# Patient Record
Sex: FEMALE | Employment: UNEMPLOYED
[De-identification: names, ages, dates, MRNs, and addresses within clinical notes are randomized per-mention and may not be internally consistent; named-entity substitution may affect disease eponyms.]

---

## 2017-10-01 ENCOUNTER — HEALTH MAINTENANCE LETTER (OUTPATIENT)
Age: 37
End: 2017-10-01

## 2018-04-23 ENCOUNTER — PRENATAL OFFICE VISIT (OUTPATIENT)
Dept: NURSING | Facility: CLINIC | Age: 38
End: 2018-04-23
Payer: MEDICAID

## 2018-04-23 ENCOUNTER — TELEPHONE (OUTPATIENT)
Dept: OBGYN | Facility: CLINIC | Age: 38
End: 2018-04-23

## 2018-04-23 VITALS
TEMPERATURE: 98 F | SYSTOLIC BLOOD PRESSURE: 119 MMHG | OXYGEN SATURATION: 98 % | HEART RATE: 90 BPM | HEIGHT: 60 IN | WEIGHT: 157.8 LBS | BODY MASS INDEX: 30.98 KG/M2 | DIASTOLIC BLOOD PRESSURE: 77 MMHG

## 2018-04-23 DIAGNOSIS — N91.2 AMENORRHEA: Primary | ICD-10-CM

## 2018-04-23 DIAGNOSIS — Z34.80 PRENATAL CARE, SUBSEQUENT PREGNANCY, UNSPECIFIED TRIMESTER: ICD-10-CM

## 2018-04-23 LAB
ALBUMIN UR-MCNC: 30 MG/DL
APPEARANCE UR: ABNORMAL
BACTERIA #/AREA URNS HPF: ABNORMAL /HPF
BETA HCG QUAL IFA URINE: POSITIVE
BILIRUB UR QL STRIP: NEGATIVE
COLOR UR AUTO: YELLOW
ERYTHROCYTE [DISTWIDTH] IN BLOOD BY AUTOMATED COUNT: 14 % (ref 10–15)
GLUCOSE UR STRIP-MCNC: 100 MG/DL
HCT VFR BLD AUTO: 37 % (ref 35–47)
HGB BLD-MCNC: 12.8 G/DL (ref 11.7–15.7)
HGB UR QL STRIP: ABNORMAL
KETONES UR STRIP-MCNC: NEGATIVE MG/DL
LEUKOCYTE ESTERASE UR QL STRIP: ABNORMAL
MCH RBC QN AUTO: 30 PG (ref 26.5–33)
MCHC RBC AUTO-ENTMCNC: 34.6 G/DL (ref 31.5–36.5)
MCV RBC AUTO: 87 FL (ref 78–100)
NITRATE UR QL: NEGATIVE
NON-SQ EPI CELLS #/AREA URNS LPF: ABNORMAL /LPF
PH UR STRIP: >9 PH (ref 5–7)
PLATELET # BLD AUTO: 289 10E9/L (ref 150–450)
RBC # BLD AUTO: 4.26 10E12/L (ref 3.8–5.2)
RBC #/AREA URNS AUTO: ABNORMAL /HPF
SOURCE: ABNORMAL
SP GR UR STRIP: 1.01 (ref 1–1.03)
UROBILINOGEN UR STRIP-ACNC: 2 EU/DL (ref 0.2–1)
WBC # BLD AUTO: 8 10E9/L (ref 4–11)
WBC #/AREA URNS AUTO: ABNORMAL /HPF

## 2018-04-23 PROCEDURE — 87086 URINE CULTURE/COLONY COUNT: CPT | Performed by: INTERNAL MEDICINE

## 2018-04-23 PROCEDURE — 86780 TREPONEMA PALLIDUM: CPT | Performed by: OBSTETRICS & GYNECOLOGY

## 2018-04-23 PROCEDURE — 87389 HIV-1 AG W/HIV-1&-2 AB AG IA: CPT | Performed by: OBSTETRICS & GYNECOLOGY

## 2018-04-23 PROCEDURE — 81001 URINALYSIS AUTO W/SCOPE: CPT | Performed by: INTERNAL MEDICINE

## 2018-04-23 PROCEDURE — G0499 HEPB SCREEN HIGH RISK INDIV: HCPCS | Performed by: OBSTETRICS & GYNECOLOGY

## 2018-04-23 PROCEDURE — 99207 ZZC NO CHARGE NURSE ONLY: CPT

## 2018-04-23 PROCEDURE — 36415 COLL VENOUS BLD VENIPUNCTURE: CPT | Performed by: INTERNAL MEDICINE

## 2018-04-23 PROCEDURE — 86900 BLOOD TYPING SEROLOGIC ABO: CPT | Performed by: INTERNAL MEDICINE

## 2018-04-23 PROCEDURE — 84703 CHORIONIC GONADOTROPIN ASSAY: CPT | Performed by: FAMILY MEDICINE

## 2018-04-23 PROCEDURE — 86850 RBC ANTIBODY SCREEN: CPT | Performed by: INTERNAL MEDICINE

## 2018-04-23 PROCEDURE — 86901 BLOOD TYPING SEROLOGIC RH(D): CPT | Performed by: INTERNAL MEDICINE

## 2018-04-23 PROCEDURE — 86762 RUBELLA ANTIBODY: CPT | Performed by: OBSTETRICS & GYNECOLOGY

## 2018-04-23 PROCEDURE — 85027 COMPLETE CBC AUTOMATED: CPT | Performed by: INTERNAL MEDICINE

## 2018-04-23 RX ORDER — PRENATAL VIT/IRON FUM/FOLIC AC 27MG-0.8MG
1 TABLET ORAL DAILY
COMMUNITY

## 2018-04-23 NOTE — TELEPHONE ENCOUNTER
Patient was seen by RN at [place] for OB intake today.   See visit.   She reports she has been using over the counter nexium a couple times a week as needed for heartburn.     Per micromedex, fetal risk cannot be ruled out.    I advised her to try to avoid using it, will consult with OB provider regarding meds for heartburn.    Pharmacy selected.      Routed to Dr. Mccullough to advise on nexium use in pregnancy.      Venecia Gilbert RN  [facility]

## 2018-04-23 NOTE — Clinical Note
Dr. Mccullough Byron saw me for pregnancy confirmation and OB intake, accompanied by her  and child on apparently short notice, no  was scheduled.   is listed on release in chart for communication and he did a fine job interpreting and patient speaks fair English.   She has seen you in the past and hopes to deliver at [place].   Early dating US ordered due to report of irregular periods the last few months. She is having some left flank and lower left abdominal pain, also pain with urination.   Denies vaginal bleeding.  UA/UC pending.    Scheduled to see you in about 3 weeks, will do US ASAP.    Thanks. Kareen Gilbert, RN [facility]

## 2018-04-23 NOTE — NURSING NOTE
Patient presents to clinic today prenatal education, she did not call ahead to schedule but  is comfortable interpreting and we do have consent to communicate with  on file. This is the patient's 8th pregnancy. She has had 2 miscarriage(s), 0 (s), 3 term birth(s) and 2  birth(s). She has 4 living child(kelly).   This was a planned pregnancy.  Reviewed answers to patient's Prenatal OB Questionnaire. Screening is positive for age over 35 years at time of delivery, mild nausea, left lower abdominal and flank pain off and on, some pain with urination.     Medical, surgical, family and ObGyn history updated as appropriate. Reviewed current medications and allergies. Patient is taking prenatal vitamins.     Discussed all of the options within [place] for her prenatal care including Dr. Marion and Dr. Maria at [facility], midwives at [facility] and OB/GYN-Dr. Esparza and Dr. Barney. Next visit scheduled with Dr. Mccullough at [place] per patient request, she hopes to deliver at [place].     Reviewed and discussed OB 1st Trimester Plans/Education  and also summarized in detail handouts and brochures included in OB Prenatal Folder that patient is able to keep and bring home with her.    Discussed all of the blood tests with pt who agrees to have all including HIV. Pt aware that results will be discussed at next office visit with MD unless abnl results are indicated and phone call will be made.    Will forward chart on to Prenatal Care Provider to review.    Venecia Gilbert RN  [facility]

## 2018-04-23 NOTE — PROGRESS NOTES
Prenatal OB Questionnaire  Past Medical History  Diabetes   No  Hypertension   No  Heart Disease, mitral valve prolapse, or rheumatic fever?   No  An autoimmune disorder such as Lupus or Rheumatoid Arthritis?   No  Kidney Disease or Urinary Tract Infection?   No  Epilepsy, seizures or spells?   No  Migraine headaches?   No  A stroke or loss of function or sensation?   No  Any other neurological problems?   No  Have you ever been treated for depression?  No  Are you having problems with crying spells or loss of self-esteem?   No  Have you ever required psychiatric care?   No  Have you ever hepatitis, liver disease or jaundice?   No  Have you ever been treated for blood clots in your veins, deep venous thrombosis, inflammation in the veins, thrombosis, phlebitis, pulmonary embolism or varicosities?   No  Have you had excessive bleeding after surgery or dental work?   No  Do you bleed more than other women after a cut or scratch?   No  Do you have a history of anemia?   No  Have you ever been treated for thyroid problems or taken thyroid medication?  No  Do you have any other endocrine problems?  No  Have you ever been in a major accident or suffered serious trauma?   No  Within the last year, has anyone hit slapped, kicked or otherwise hurt you?  No  In the last year, has anyone forced you to have sex when you didn't want to?  No  Have you ever had a blood transfusion?   No  Would you refuse a blood transfusion if a doctor judged it to be medically necessary?   No  If you answered yes, would you rather die than have a blood transfusion?   N/A  If you answered yes, is this for Synagogue reasons?   N/A  Does anyone in your home smoke?   No  Do you use tobacco products?  No  Do you drink beer, wine, hard liquor?  No  Do you use any of the following: marijuana, speed, cocaine, heroine, hallucinogens, or other drugs?  No  Is your blood type Rh negative?   No  Have you ever had abnormal antibodies in your blood?   No  Have  you ever had asthma?   No  Have you ever had tuberculosis?   Yes, latent TB, was treated about 4-5 years ago  Do you have any allergies to drugs or over-the-counter medications?   Yes    Allergies as of 4/23/2018:    Allergies as of 04/23/2018 - [place] as Reviewed 04/23/2018   Allergen Reaction Noted     Lactated ringers Shortness Of Breath 05/22/2014     Latex  01/25/2013     Penicillins  06/04/2010       Do you have any breast problems?   No  Have you ever ?   Yes, about 1-2 months with her previous living children  Have you had any gynecological surgical procedures such as cervical conization, a LEEP procedure, laser treatment, cryosurgery of the cervix, or a dilation and curettage, etc?  No  Have you had any other surgical procedures?  No  Have you been hospitalized for a nonsurgical reason excluding normal delivery?   No  Have you ever had any anesthetic complications?   No  Have you ever had an abnormal pap smear?   No  Do you have a history of abnormalities of the uterus?   No  Did it take you more than one year to become pregnant?   No  Have you ever been evaluated or treated for infertility?   No  Is there a history of medical problems in your family, which you feel might adversely affect your health or pregnancy?   No  Do you have any other problems we have not asked you about which you feel may be important to this pregnancy?  No    Symptoms since Last Menstrual Period  Do you have any of the following:    *abdominal pain  Yes, reports low left intermittent abdominal pain, also pain to left flank for about 4-5 days, has pain with urination but denies chills or foul odor to urine.   Pain sometimes lasts an hour, denies vaginal bleeding  *blood in stool or urine  No  *chest pain  No  *shortness of breath  No  *coughing or vomiting up blood No  *heart racing or skipping beats  No  *nausea and vomiting  Yes, nausea for the past couple days, no vomiting, states is improving  *pain with urination  Yes,  for about 4-5 days, as above, denies chills or fever  *vaginal discharge or bleeding  No  Current medications are:  Current Outpatient Prescriptions   Medication Sig Dispense Refill     acetaminophen (TYLENOL) 325 MG tablet Take 325-650 mg by mouth every 6 hours as needed       Cholecalciferol (VITAMIN D) 2000 UNITS tablet 2 tabs daily for one month, then 1 tab daily 100 tablet 3     Prenatal Vit-Fe Fumarate-FA (PRENATAL MULTIVITAMIN PLUS IRON) 27-0.8 MG TABS per tablet Take 1 tablet by mouth daily       She reports she does use nexium a couple times a week, per [employer] online reference, fetal risk with this cannot be ruled out.   Phone encounter routed to Dr. Mccullough to advise on this.  Genetic Screening  At the time of birth, will you be 35 years old or older?  Yes  Has the patient, baby s father, or anyone in either family had:  Thalassemia (Italian, Greek, Mediterranean, or  background only) and an MCV result less than 80?  No  Neural tube defect such as meningomyelocele, spina bifida or anencephaly?  No  Congenital heart defect?  No  Down s syndrome?  No  Yahir-Sach s disease (Presybeterian, Cajun, French-[place])?  No  Sickle cell disease or trait (Evelia)?  No  Hemophilia or other inherited problems of blood coagulation? No  Muscular dystrophy?  No  Cystic Fibrosis?  No  [place] s chorea?  No  Mental retardation/autism? No   If yes, was the person tested for fragile X?  N/A  Any other inherited genetic or chromosomal disorder?  No  Maternal metabolic disorder (e.g. insulin-dependent diabetes, PKU)? No  A child with birth defects not listed above?  No  Recurrent pregnancy loss or a stillbirth?  No  Has the patient had any medications/street drugs/alcohol since her last menstrual period? No  Does the patient or baby s father have any other genetic risks?  No  Infection History  Do you object to being tested for Hepatitis B? No  Do you object to being tested for HIV? No  Do you feel that you are at high risk for  coming in contact with the AIDS virus?  No  Have you ever been treated for tuberculosis?  Yes, 4-5 years ago for latent TB  Have you ever received the BCG vaccine for tuberculosis?  No  Have you ever had a positive skin test for tuberculosis? Yes  Do you live with someone who has tuberculosis?  No  Have you ever been exposed to tuberculosis?  Yes, treated for latent TB 4-5 years ago  Do you have genital herpes?  No  Does your partner have genital herpes?  No  Have you had a rash or viral illness since your last period?  No  Have you ever had Gonorrhea, Chlamydia, Syphilis, venereal warts, trichomoniasis, pelvic inflammatory disease or any other sexually transmitted disease?  No  Do you know if you are a genital group B streptococcus carrier? No  You have not had chicken pox/varicella  Yes  Have you been vaccinated against chicken pox?  Yes  Have you had any other infectious disease? No        Early ultrasound screening tool:    Does patient have irregular periods?  Yes  Did patient use hormonal birth control in the three months prior to positive urine pregnancy test? No  Is the patient breastfeeding?  No  Is the patient 10 weeks or greater at time of education visit?  No    Early dating US ordered, number provided to  and patient to call to schedule.    Chart routed to OB provider Dr. Mccullough to review and await lab results.      Venecia Gilbert RN  [facility]

## 2018-04-23 NOTE — PATIENT INSTRUCTIONS
Call [facility] Scheduling at [phone] to schedule the early dating US BEFORE your appt with Dr. Mccullough, let them know that you would rather do this at the [place] location

## 2018-04-23 NOTE — TELEPHONE ENCOUNTER
I called and spoke to patient at [phone], she put  Phillip on the phone, we do have consent to communicate with him on file.    Advised him of Dr. Mccullough's advice regarding the nexium.    He verbalized understanding.    Venecia Gilbert RN  [facility]

## 2018-04-23 NOTE — TELEPHONE ENCOUNTER
Nexium is not of known high risk but suggest avoiding it now and returning to simple over the counter antacids such as Tums or Rolaids in the first trimester and avoidance of foods that aggravate the condition. Please notify.   Saran Mccullough MD

## 2018-04-23 NOTE — MR AVS SNAPSHOT
After Visit Summary   4/23/2018    Monserrat White    MRN: [id number]           Patient Information     Date Of Birth          1980        Visit Information        Provider Department      4/23/2018 2:00 PM PHONE, ; CP RN [facility]        Today's Diagnoses     Amenorrhea    -  1    Prenatal care, subsequent pregnancy, unspecified trimester          Care Instructions    Call [facility] Scheduling at [phone] to schedule the early dating US BEFORE your appt with Dr. Mccullough, let them know that you would rather do this at the [facility]          Follow-ups after your visit        Your next 10 appointments already scheduled     May 16, 2018  8:30 AM CDT   New Prenatal with Saran Mccullough MD   [facility] ([facility])    [address] [place] [address] [phone]              Future tests that were ordered for you today     Open Future Orders        Priority Expected Expires Ordered    US OB < 14 weeks, single,  for dating (EWV446) Routine  7/22/2018 4/23/2018            Who to contact     If you have questions or need follow up information about today's clinic visit or your schedule please contact [facility] directly at [phone].  Normal or non-critical lab and imaging results will be communicated to you by MyChart, letter or phone within 4 business days after the clinic has received the results. If you do not hear from us within 7 days, please contact the clinic through MyChart or phone. If you have a critical or abnormal lab result, we will notify you by phone as soon as possible.  Submit refill requests through Truist or call your pharmacy and they will forward the refill request to us. Please allow 3 business days for your refill to be completed.          Additional Information About Your Visit        [employer] Information     T[employer]ist lets you send  "messages to your doctor, view your test results, renew your prescriptions, schedule appointments and more. To sign up, go to [email or web address] . Click on \"Log in\" on the left side of the screen, which will take you to the Welcome page. Then click on \"Sign up Now\" on the right side of the page.     You will be asked to enter the access code listed below, as well as some personal information. Please follow the directions to create your username and password.     Your access code is: [email or web address]  Expires: 2018  2:47 PM     Your access code will  in 90 days. If you need help or a new code, please call your [place] clinic or [phone].        Care EveryWhere ID     This is your Care EveryWhere ID. This could be used by other organizations to access your [place] medical records  [id number]        Your Vitals Were     Pulse Temperature Height Last Period Pulse Oximetry Breastfeeding?    90 98  F (36.7  C) 5' (1.524 m) 2018 (Exact Date) 98% No    BMI (Body Mass Index)                   30.82 kg/m2            Blood Pressure from Last 3 Encounters:   18 119/77   01/22/15 113/75   14 112/73    Weight from Last 3 Encounters:   18 157 lb 12.8 oz (71.6 kg)   01/22/15 151 lb (68.5 kg)   14 149 lb 8 oz (67.8 kg)              We Performed the Following     ABO/RH Type and Screen     Anti Treponema     Beta HCG qual IFA urine     CBC with Platelets     Hepatitis B surface antigen     HIV Antigen Antibody Combo     Rubella Antibody IgG Quantitative     UA  Macroscopoic with Reflex to Micro     Urine Culture Aerobic Bacterial        Primary Care Provider Office Phone # Fax #    Brandan Miller [phone]       [address] [facility] [address]        Equal Access to Services     MARILEE WHITMORE : Gualberto Burton, cade reyna, yifan bateman. [facility] [phone].    ATENCIÓN: Si " barry hernandez, tiene a fernandez disposición servicios gratuitos de asistencia lingüística. Ekaterina pal [phone].    We comply with applicable federal civil rights laws and [place] laws. We do not discriminate on the basis of race, color, national origin, age, disability, sex, sexual orientation, or gender identity.            Thank you!     Thank you for choosing [facility]  for your care. Our goal is always to provide you with excellent care. Hearing back from our patients is one way we can continue to improve our services. Please take a few minutes to complete the written survey that you may receive in the mail after your visit with us. Thank you!             Your Updated Medication List - Protect others around you: Learn how to safely use, store and throw away your medicines at www.disposemymeds.org.          This list is accurate as of 4/23/18  2:47 PM.  Always use your most recent med list.                   Brand Name Dispense Instructions for use Diagnosis    prenatal multivitamin plus iron 27-0.8 MG Tabs per tablet      Take 1 tablet by mouth daily        TYLENOL 325 MG tablet   Generic drug:  acetaminophen      Take 325-650 mg by mouth every 6 hours as needed        vitamin D 2000 units tablet     100 tablet    2 tabs daily for one month, then 1 tab daily    Vitamin D deficiency

## 2018-04-24 LAB
ABO + RH BLD: NORMAL
ABO + RH BLD: NORMAL
BACTERIA SPEC CULT: NORMAL
BLD GP AB SCN SERPL QL: NORMAL
BLOOD BANK CMNT PATIENT-IMP: NORMAL
HBV SURFACE AG SERPL QL IA: NONREACTIVE
HIV 1+2 AB+HIV1 P24 AG SERPL QL IA: NONREACTIVE
RUBV IGG SERPL IA-ACNC: 87 IU/ML
SPECIMEN EXP DATE BLD: NORMAL
SPECIMEN SOURCE: NORMAL
T PALLIDUM IGG+IGM SER QL: NEGATIVE

## 2018-04-27 ENCOUNTER — RADIANT APPOINTMENT (OUTPATIENT)
Dept: ULTRASOUND IMAGING | Facility: CLINIC | Age: 38
End: 2018-04-27
Attending: OBSTETRICS & GYNECOLOGY
Payer: MEDICAID

## 2018-04-27 DIAGNOSIS — Z34.80 PRENATAL CARE, SUBSEQUENT PREGNANCY, UNSPECIFIED TRIMESTER: ICD-10-CM

## 2018-04-27 PROBLEM — O20.8 SUBCHORIONIC HEMORRHAGE IN FIRST TRIMESTER: Status: ACTIVE | Noted: 2018-04-27

## 2018-04-27 PROCEDURE — T1013 SIGN LANG/ORAL INTERPRETER: HCPCS | Mod: U3

## 2018-04-27 PROCEDURE — 76801 OB US < 14 WKS SINGLE FETUS: CPT

## 2018-05-16 ENCOUNTER — PRENATAL OFFICE VISIT (OUTPATIENT)
Dept: OBGYN | Facility: CLINIC | Age: 38
End: 2018-05-16
Payer: MEDICAID

## 2018-05-16 VITALS
WEIGHT: 153 LBS | BODY MASS INDEX: 29.88 KG/M2 | SYSTOLIC BLOOD PRESSURE: 116 MMHG | HEART RATE: 89 BPM | TEMPERATURE: 97.9 F | DIASTOLIC BLOOD PRESSURE: 77 MMHG

## 2018-05-16 DIAGNOSIS — Z34.80 PRENATAL CARE, SUBSEQUENT PREGNANCY, UNSPECIFIED TRIMESTER: ICD-10-CM

## 2018-05-16 PROCEDURE — 99207 ZZC FIRST OB VISIT: CPT | Performed by: OBSTETRICS & GYNECOLOGY

## 2018-05-16 PROCEDURE — T1013 SIGN LANG/ORAL INTERPRETER: HCPCS | Mod: U3 | Performed by: OBSTETRICS & GYNECOLOGY

## 2018-05-16 NOTE — MR AVS SNAPSHOT
After Visit Summary   5/16/2018    Monserrat White    MRN: [id number]           Patient Information     Date Of Birth          1980        Visit Information        Provider Department      5/16/2018 8:15 AM Saran Mccullough MD; [employer] [facility]        Today's Diagnoses     Subchorionic hemorrhage in first trimester        Prenatal care, subsequent pregnancy, unspecified trimester          Care Instructions                                                        If you have any questions regarding your visit, Please contact your care team.     [facility] Services: [phone]    [facility] CLINIC HOURS TELEPHONE NUMBER       Saran Mccullough M.D.      Beckie-      Milady-NITIN Bello-NITIN Mason-Medical Assistant       [facility]  8:00a.m-4:45 p.m  [facility]  9:00a.m-11:45 a.m  [facility] 8:00a.m-4:45 p.m.  [facility]  8:00a.m-4:45 p.m.  Friday[facility]  8:00a.m-4:45 p.m. [facility]  [address]  [place] [id number] [phone] ask [facility]  [phone] Fax  Imaging [phone]    [facility] Labor and Delivery  [address] [place] [address] [place] [address]  [phone]    [facility]  [address] [facility] [place] [address] [phone] [facility]  [phone] Fax  Imaging [phone]     Urgent Care locations:    [facility] Monday-Friday  5 pm - 9 pm  Saturday and Sunday   9 am - 5 pm    Monday-Friday   11 am - 9 pm  Saturday and Sunday   9 am - 5 pm   [phone]       If you need a medication refill, please contact your pharmacy. Please allow 3 business days for your refill to be completed.  As always, Thank you for trusting us with your healthcare needs!            Follow-ups after your visit        Who to contact     If you have questions  "or need follow up information about today's clinic visit or your schedule please contact [facility] directly at [phone].  Normal or non-critical lab and imaging results will be communicated to you by MyChart, letter or phone within 4 business days after the clinic has received the results. If you do not hear from us within 7 days, please contact the clinic through [employer]hart or phone. If you have a critical or abnormal lab result, we will notify you by phone as soon as possible.  Submit refill requests through Insync or call your pharmacy and they will forward the refill request to us. Please allow 3 business days for your refill to be completed.          Additional Information About Your Visit        [employer]Appetas Information     Insync lets you send messages to your doctor, view your test results, renew your prescriptions, schedule appointments and more. To sign up, go to [email or web address] . Click on \"Log in\" on the left side of the screen, which will take you to the Welcome page. Then click on \"Sign up Now\" on the right side of the page.     You will be asked to enter the access code listed below, as well as some personal information. Please follow the directions to create your username and password.     Your access code is: [email or web address]  Expires: 2018  2:47 PM     Your access code will  in 90 days. If you need help or a new code, please call your [place] clinic or [phone].        Care EveryWhere ID     This is your Care EveryWhere ID. This could be used by other organizations to access your [place] medical records  [id number]        Your Vitals Were     Pulse Temperature Last Period Breastfeeding? BMI (Body Mass Index)       89 97.9  F (36.6  C) (Oral) 2018 (Exact Date) No 29.88 kg/m2        Blood Pressure from Last 3 Encounters:   18 116/77   18 119/77   01/22/15 113/75    Weight from Last 3 Encounters:   18 153 lb (69.4 kg)   18 157 lb " 12.8 oz (71.6 kg)   01/22/15 151 lb (68.5 kg)              Today, you had the following     No orders found for display       Primary Care Provider Office Phone # Fax #    Brandan Miller [phone]       [address] [facility] [address]        Equal Access to Services     [facility]HMA : Hadii aad ku hadasho Soomaali, waaxda luqadaha, qaybta kaalmada adeegyada, waxay idiin hayaan adeeg kharash laGaloaan . So [facility] [phone].    ATENCIÓN: Si habla español, tiene a fernandez disposición servicios gratuitos de asistencia lingüística. Llame al [phone].    We comply with applicable federal civil rights laws and [place] laws. We do not discriminate on the basis of race, color, national origin, age, disability, sex, sexual orientation, or gender identity.            Thank you!     Thank you for choosing [facility]  for your care. Our goal is always to provide you with excellent care. Hearing back from our patients is one way we can continue to improve our services. Please take a few minutes to complete the written survey that you may receive in the mail after your visit with us. Thank you!             Your Updated Medication List - Protect others around you: Learn how to safely use, store and throw away your medicines at www.disposemymeds.org.          This list is accurate as of 5/16/18  8:40 AM.  Always use your most recent med list.                   Brand Name Dispense Instructions for use Diagnosis    prenatal multivitamin plus iron 27-0.8 MG Tabs per tablet      Take 1 tablet by mouth daily        TYLENOL 325 MG tablet   Generic drug:  acetaminophen      Take 325-650 mg by mouth every 6 hours as needed        vitamin D 2000 units tablet     100 tablet    2 tabs daily for one month, then 1 tab daily    Vitamin D deficiency

## 2018-05-16 NOTE — PATIENT INSTRUCTIONS
If you have any questions regarding your visit, Please contact your care team.     [facility]: [phone]    [facility] CLINIC HOURS TELEPHONE NUMBER       Saran Mccullough M.D.      Beckie-      Prakash Mason-Medical Assistant       Monday-[place]  8:00a.m-4:45 p.m  Tuesday-[place]  9:00a.m-11:45 a.m  Wednesday-[place] 8:00a.m-4:45 p.m.  Thursday-[place]  8:00a.m-4:45 p.m.  Friday-[place]  8:00a.m-4:45 p.m. [facility]  [address] [place] [address]  [phone] ask for [facility]  [phone] Fax  Imaging [phone]    [facility] Labor and Delivery  [address] [place] [address] [place] [address]  [phone]    [facility]  [address] [place] [address]  [phone] ask [facility]  [phone] Fax  Imaging [phone]     Urgent Care locations:    [facility] Monday-Friday  5 pm - 9 pm  Saturday and Sunday   9 am - 5 pm    Monday-Friday   11 am - 9 pm  Saturday and Sunday   9 am - 5 pm   [phone]       If you need a medication refill, please contact your pharmacy. Please allow 3 business days for your refill to be completed.  As always, Thank you for trusting us with your healthcare needs!

## 2018-05-18 NOTE — PROGRESS NOTES
Monserrat White is a 37 year old year old G 8 P 4 who presents for an initial obstetrical visit.  Referred by self.    Currently experiencing normal pregnancy symptoms without particular problems including pain, bleeding, marked vomiting or weight loss except: some N/V- improving, mild cramping and spotting with [place] [facility] evaluation.  This was a planned pregnancy and probable last one.  Here today with  and youngest son- doing well and .  Additional concerns: dates per early u/s, one late  birth but subsequent term deliveries without intervention, SAB x 2, mid-gestational IUFD due to apparent cord accident, history of gest DM, shoulder dystocia, and subsequent impaired glucose tolerance, AMA, proteinuria on recent UA, GERD increased, some epistaxis before and increased in pregnancy.     ROS:     Systems reviewed include constitutional; breast;                 cardiac; respiratory; gastrointestinal; genitourinary;                                musculoskeletal; integumentary; psychological;                                hematologic/lymphatic and endocrine.                  These systems were negative for significant symptoms except                 for the following: none and see above HPI.    Past medical, obstetrical, surgical, family and social history reviewed and as noted or updated in chart.     Allergies, meds and supplements are as noted or updated in chart.                    Episode OB dating, demographic and history forms completed or reviewed.    EXAM:  VS as noted. BMI- Body mass index is 29.88 kg/(m^2).    Relatively recent normal general exam- not repeated now.         ASSESSMENT: (O41.8X10,  O46.8X1) Subchorionic hemorrhage in first trimester  Comment:   Plan:     (Z34.80) Prenatal care, subsequent pregnancy, unspecified trimester  Comment:   Plan: Glucose tolerance gest std 100 gm 3 hr, Basic         metabolic panel               PLAN:  Advice appropriate to  gestational age reviewed including pertinent Checklist items. Discussed condition, tests and general care plan. Symptoms, problems and concerns reviewed. Recommended weight gain discussed. Problem list initiated. Pelvic rest and precautions given. Check 3h GTT and BMP soon. Pap due in 6 weeks. Orders as noted. RTC in 2-3 weeks. Discuss special screening tests next.    Saran Mccullough MD

## 2018-05-30 DIAGNOSIS — Z34.80 PRENATAL CARE, SUBSEQUENT PREGNANCY, UNSPECIFIED TRIMESTER: ICD-10-CM

## 2018-05-30 LAB
ANION GAP SERPL CALCULATED.3IONS-SCNC: 13 MMOL/L (ref 3–14)
BUN SERPL-MCNC: 7 MG/DL (ref 7–30)
CALCIUM SERPL-MCNC: 9.2 MG/DL (ref 8.5–10.1)
CHLORIDE SERPL-SCNC: 105 MMOL/L (ref 94–109)
CO2 SERPL-SCNC: 19 MMOL/L (ref 20–32)
CREAT SERPL-MCNC: 0.42 MG/DL (ref 0.52–1.04)
GFR SERPL CREATININE-BSD FRML MDRD: >90 ML/MIN/1.7M2
GLUCOSE 1H P 100 G GLC PO SERPL-MCNC: 227 MG/DL (ref 60–179)
GLUCOSE 2H P 100 G GLC PO SERPL-MCNC: 258 MG/DL (ref 60–154)
GLUCOSE 3H P 100 G GLC PO SERPL-MCNC: 199 MG/DL (ref 60–139)
GLUCOSE P FAST SERPL-MCNC: 115 MG/DL (ref 60–94)
GLUCOSE SERPL-MCNC: 119 MG/DL (ref 70–99)
POTASSIUM SERPL-SCNC: 3.7 MMOL/L (ref 3.4–5.3)
SODIUM SERPL-SCNC: 137 MMOL/L (ref 133–144)

## 2018-05-30 PROCEDURE — T1013 SIGN LANG/ORAL INTERPRETER: HCPCS | Mod: U3

## 2018-05-30 PROCEDURE — 80048 BASIC METABOLIC PNL TOTAL CA: CPT | Performed by: OBSTETRICS & GYNECOLOGY

## 2018-05-30 PROCEDURE — 82952 GTT-ADDED SAMPLES: CPT | Performed by: OBSTETRICS & GYNECOLOGY

## 2018-05-30 PROCEDURE — 36415 COLL VENOUS BLD VENIPUNCTURE: CPT | Performed by: OBSTETRICS & GYNECOLOGY

## 2018-05-30 PROCEDURE — 82951 GLUCOSE TOLERANCE TEST (GTT): CPT | Performed by: OBSTETRICS & GYNECOLOGY

## 2018-05-31 DIAGNOSIS — O24.410 DIET CONTROLLED GESTATIONAL DIABETES MELLITUS (GDM) IN FIRST TRIMESTER: Primary | ICD-10-CM

## 2018-06-03 PROBLEM — O24.419 GESTATIONAL DIABETES MELLITUS (GDM), ANTEPARTUM: Status: ACTIVE | Noted: 2018-06-03

## 2018-06-22 ENCOUNTER — OFFICE VISIT (OUTPATIENT)
Dept: FAMILY MEDICINE | Facility: CLINIC | Age: 38
End: 2018-06-22
Payer: MEDICAID

## 2018-06-22 VITALS
HEART RATE: 99 BPM | TEMPERATURE: 97.8 F | HEIGHT: 60 IN | OXYGEN SATURATION: 94 % | BODY MASS INDEX: 30.27 KG/M2 | DIASTOLIC BLOOD PRESSURE: 71 MMHG | SYSTOLIC BLOOD PRESSURE: 111 MMHG | WEIGHT: 154.2 LBS | RESPIRATION RATE: 16 BRPM

## 2018-06-22 DIAGNOSIS — Z34.92 PRENATAL CARE IN SECOND TRIMESTER: ICD-10-CM

## 2018-06-22 DIAGNOSIS — G44.219 EPISODIC TENSION-TYPE HEADACHE, NOT INTRACTABLE: Primary | ICD-10-CM

## 2018-06-22 PROCEDURE — 99214 OFFICE O/P EST MOD 30 MIN: CPT | Performed by: PHYSICIAN ASSISTANT

## 2018-06-22 RX ORDER — CYCLOBENZAPRINE HCL 10 MG
5 TABLET ORAL 2 TIMES DAILY PRN
Qty: 30 TABLET | Refills: 0 | Status: SHIPPED | OUTPATIENT
Start: 2018-06-22 | End: 2018-08-13

## 2018-06-22 ASSESSMENT — PAIN SCALES - GENERAL: PAINLEVEL: SEVERE PAIN (6)

## 2018-06-22 NOTE — MR AVS SNAPSHOT
After Visit Summary   6/22/2018    Monserrat White    MRN: [id number]           Patient Information     Date Of Birth          1980        Visit Information        Provider Department      6/22/2018 11:00 AM Izabel Ross PA-C [facility]        Today's Diagnoses     Episodic tension-type headache, not intractable    -  1    Prenatal care in second trimester          Care Instructions    Flexeril 5 mg twice a day as needed and Tylenol 500 mg every 6 hours as needed for pain   See OBGYN as soon as possible     Wear compression stockings daily to prevent leg swelling  Make appointment with diabetes educator          Follow-ups after your visit        Additional Services     OB/GYN REFERRAL       Your provider has referred you to:  FM[facility]: [facility] [phone]   [email or web address]     Please be aware that coverage of these services is subject to the terms and limitations of your health insurance plan.  Call member services at your health plan with any benefit or coverage questions.      Please bring the following with you to your appointment:    (1) Any X-Rays, CTs or MRIs which have been performed.  Contact the facility where they were done to arrange for  prior to your scheduled appointment.   (2) List of current medications   (3) This referral request   (4) Any documents/labs given to you for this referral                  Your next 10 appointments already scheduled     Jun 29, 2018 11:15 AM WILDT   [facility] Established Prenatal with Saran Mccullough MD   [facility]    [address] [place] [address] [phone]              Who to contact     If you have questions or need follow up information about today's clinic visit or your schedule please contact [facility] directly at  [phone].  Normal or non-critical lab and imaging results will be communicated to you by MyChart, letter or phone within 4 business days after the clinic has received the results. If you do not hear from us within 7 days, please contact the clinic through MyChart or phone. If you have a critical or abnormal lab result, we will notify you by phone as soon as possible.  Submit refill requests through [employer] or call your pharmacy and they will forward the refill request to us. Please allow 3 business days for your refill to be completed.          Additional Information About Your Visit        Care EveryWhere ID     This is your Care EveryWhere ID. This could be used by other organizations to access your [facility] medical records  [id number]        Your Vitals Were     Pulse Temperature Respirations Height Last Period Pulse Oximetry    99 97.8  F (36.6  C) (Oral) 16 5' (1.524 m) 03/03/2018 (Exact Date) 94%    BMI (Body Mass Index)                   30.12 kg/m2            Blood Pressure from Last 3 Encounters:   06/22/18 111/71   05/16/18 116/77   04/23/18 119/77    Weight from Last 3 Encounters:   06/22/18 154 lb 3.2 oz (69.9 kg)   05/16/18 153 lb (69.4 kg)   04/23/18 157 lb 12.8 oz (71.6 kg)              We Performed the Following     OB/GYN REFERRAL          Today's Medication Changes          These changes are accurate as of 6/22/18 11:51 AM.  If you have any questions, ask your nurse or doctor.               Start taking these medicines.        Dose/Directions    cyclobenzaprine 10 MG tablet   Commonly known as:  FLEXERIL   Used for:  Episodic tension-type headache, not intractable   Started by:  Izabel Ross PA-C        Dose:  5 mg   Take 0.5 tablets (5 mg) by mouth 2 times daily as needed for muscle spasms   Quantity:  30 tablet   Refills:  0            Where to get your medicines      These medications were sent to [facility]/pharmacy #[id number] - [facility] [address] AT [facility]   [address] [facility] [address]     Phone:  [phone]     cyclobenzaprine 10 MG tablet                Primary Care Provider Office Phone # Fax #    Brandan Miller [phone] [address] [facility] [address]        Equal Access to Services     MARILEE WHITMORE : Hadii aad ku hadasho Soomaali, waaxda luqadaha, qaybta kaalmada adeegyada, waxay idiin hayaan adeeg kerridom laniurka . So [facility] [phone].    ATENCIÓN: Si habla español, tiene a fernandez disposición servicios gratuitos de asistencia lingüística. Llame al [phone].    We comply with applicable federal civil rights laws and [place] laws. We do not discriminate on the basis of race, color, national origin, age, disability, sex, sexual orientation, or gender identity.            Thank you!     Thank you for choosing [facility]  for your care. Our goal is always to provide you with excellent care. Hearing back from our patients is one way we can continue to improve our services. Please take a few minutes to complete the written survey that you may receive in the mail after your visit with us. Thank you!             Your Updated Medication List - Protect others around you: Learn how to safely use, store and throw away your medicines at www.disposemymeds.org.          This list is accurate as of 6/22/18 11:51 AM.  Always use your most recent med list.                   Brand Name Dispense Instructions for use Diagnosis    cyclobenzaprine 10 MG tablet    FLEXERIL    30 tablet    Take 0.5 tablets (5 mg) by mouth 2 times daily as needed for muscle spasms    Episodic tension-type headache, not intractable       prenatal multivitamin plus iron 27-0.8 MG Tabs per tablet      Take 1 tablet by mouth daily        vitamin D 2000 units tablet     100 tablet    2 tabs daily for one month, then 1 tab daily    Vitamin D deficiency

## 2018-06-22 NOTE — PATIENT INSTRUCTIONS
Flexeril 5 mg twice a day as needed and Tylenol 500 mg every 6 hours as needed for pain   See OBGYN as soon as possible     Wear compression stockings daily to prevent leg swelling  Make appointment with diabetes educator

## 2018-06-22 NOTE — PROGRESS NOTES
SUBJECTIVE:   Monserrat White is a 38 year old female who presents to clinic today for the following health issues:    Concern - Headaches, cramps, and sweling feet  Onset: 2 months cramps, headaches and feet 1 week ago     Description:   Headaches; sharp goes from the front to the back, mild neck tension and sharp pain when twists side to side. Currently very mild.   Cramps: lower and left side happened 3 times in the last week. No cramping currently, no vaginal bleeding or discharge. Patient has not been seen by OB for the last 6 weeks.   Leg swelling is very occasional and happens after prolonged standing or walking, it resolves in the morning.  currently has no swelling, but was present 3 days ago.     Intensity: mild to moderate and intermittent     Progression of Symptoms:  worsening    Therapies Tried and outcome: none        Problem list and histories reviewed & adjusted, as indicated.  Additional history: as documented    Patient Active Problem List   Diagnosis     Optic nerve swelling OU     Arachnoid cyst     Impaired glucose tolerance     TB lung, latent     Prenatal care, subsequent pregnancy, unspecified trimester     Subchorionic hemorrhage in first trimester     Antepartum multigravida of advanced maternal age     Gestational diabetes mellitus (GDM), antepartum     Past Surgical History:   Procedure Laterality Date     C REGULAR ECHO (FL)  2013    normal (murmur benign)     CHOLECYSTECTOMY, LAPOROSCOPIC  2005     DILATION AND CURETTAGE SUCTION  2010     DILATION AND CURETTAGE SUCTION  1/31/2013    Procedure: DILATION AND CURETTAGE SUCTION;  Suction D&C, mac anesthesia, para cervical block;  Surgeon: Saran Mccullough MD;  Location: [facility] OR     ENDOSCOPY  2010    neg     HC INSERTION INTRAUTERINE DEVICE  2008    Mirena     HC REMOVE INTRAUTERINE DEVICE  2008       Social History   Substance Use Topics     Smoking status: Never Smoker     Smokeless tobacco: Never Used     Alcohol use No      Family History   Problem Relation Age of Onset     Hypertension Mother      Diabetes Brother      Cerebrovascular Disease Brother      Hypertension Brother      Alzheimer Disease Father 78     Alzheimer Disease Maternal Grandmother      unknown     Cancer No family hx of      Thyroid Disease No family hx of      Glaucoma No family hx of      Macular Degeneration No family hx of      Breast Cancer No family hx of      Cancer - colorectal No family hx of      Genetic Disorder No family hx of          Current Outpatient Prescriptions   Medication Sig Dispense Refill     Cholecalciferol (VITAMIN D) 2000 UNITS tablet 2 tabs daily for one month, then 1 tab daily 100 tablet 3     cyclobenzaprine (FLEXERIL) 10 MG tablet Take 0.5 tablets (5 mg) by mouth 2 times daily as needed for muscle spasms 30 tablet 0     Prenatal Vit-Fe Fumarate-FA (PRENATAL MULTIVITAMIN PLUS IRON) 27-0.8 MG TABS per tablet Take 1 tablet by mouth daily       Allergies   Allergen Reactions     Lactated Ringers Shortness Of Breath     Other reaction(s): Difficulty breathing     Latex      Penicillins      Rash         Reviewed and updated as needed this visit by clinical staff  Tobacco  Allergies  Meds  Problems  Med Hx  Surg Hx  Fam Hx  Soc Hx        Reviewed and updated as needed this visit by Provider  Allergies  Meds  Problems         ROS:  Constitutional, HEENT, cardiovascular, pulmonary, GI, , musculoskeletal, neuro, skin, endocrine and psych systems are negative, except as otherwise noted.    OBJECTIVE:     /71 (BP Location: Right arm, Patient Position: Sitting, Cuff Size: Adult Large)  Pulse 99  Temp 97.8  F (36.6  C) (Oral)  Resp 16  Ht 5' (1.524 m)  Wt 154 lb 3.2 oz (69.9 kg)  LMP 03/03/2018 (Exact Date)  SpO2 94%  BMI 30.12 kg/m2  Body mass index is 30.12 kg/(m^2).  GENERAL: healthy, alert and no distress  EYES: Eyes grossly normal to inspection, PERRL and conjunctivae and sclerae normal  HENT: ear canals and TM's  normal, nose and mouth without ulcers or lesions  NECK: no adenopathy, no asymmetry, masses, or scars and thyroid normal to palpation  RESP: lungs clear to auscultation - no rales, rhonchi or wheezes  CV: regular rate and rhythm, normal S1 S2, no S3 or S4, no murmur, click or rub, no peripheral edema and peripheral pulses strong  ABDOMEN: soft, nontender, without hepatosplenomegaly or masses, bowel sounds normal and pregnant.   MS: no gross musculoskeletal defects noted, no edema  Fetal HR: 160-170 range  Diagnostic Test Results:  none     ASSESSMENT/PLAN:       ICD-10-CM    1. Episodic tension-type headache, not intractable G44.219 cyclobenzaprine (FLEXERIL) 10 MG tablet   2. Prenatal care in second trimester Z34.92 OB/GYN REFERRAL     Flexeril 5 mg twice a day as needed and Tylenol 500 mg every 6 hours as needed for pain   See OBGYN as soon as possible     Wear compression stockings daily to prevent leg swelling  Make appointment with diabetes educator      Izabel Ross PA-C  [facility]

## 2018-06-29 ENCOUNTER — PRENATAL OFFICE VISIT (OUTPATIENT)
Dept: OBGYN | Facility: CLINIC | Age: 38
End: 2018-06-29
Attending: PHYSICIAN ASSISTANT
Payer: MEDICAID

## 2018-06-29 VITALS
BODY MASS INDEX: 30.47 KG/M2 | DIASTOLIC BLOOD PRESSURE: 70 MMHG | SYSTOLIC BLOOD PRESSURE: 110 MMHG | WEIGHT: 156 LBS | TEMPERATURE: 98.1 F | HEART RATE: 94 BPM

## 2018-06-29 DIAGNOSIS — Z34.80 PRENATAL CARE, SUBSEQUENT PREGNANCY, UNSPECIFIED TRIMESTER: ICD-10-CM

## 2018-06-29 DIAGNOSIS — O23.42 INFECTION OF URINARY TRACT IN PREGNANCY IN SECOND TRIMESTER: ICD-10-CM

## 2018-06-29 DIAGNOSIS — O24.419 GESTATIONAL DIABETES MELLITUS (GDM), ANTEPARTUM, GESTATIONAL DIABETES METHOD OF CONTROL UNSPECIFIED: ICD-10-CM

## 2018-06-29 LAB — HBA1C MFR BLD: 5.7 % (ref 0–5.6)

## 2018-06-29 PROCEDURE — 83036 HEMOGLOBIN GLYCOSYLATED A1C: CPT | Performed by: OBSTETRICS & GYNECOLOGY

## 2018-06-29 PROCEDURE — 99207 ZZC PRENATAL VISIT: CPT | Performed by: OBSTETRICS & GYNECOLOGY

## 2018-06-29 PROCEDURE — T1013 SIGN LANG/ORAL INTERPRETER: HCPCS | Mod: U3 | Performed by: OBSTETRICS & GYNECOLOGY

## 2018-06-29 PROCEDURE — 36415 COLL VENOUS BLD VENIPUNCTURE: CPT | Performed by: OBSTETRICS & GYNECOLOGY

## 2018-06-29 RX ORDER — CEFUROXIME AXETIL 500 MG/1
500 TABLET ORAL
COMMUNITY
Start: 2018-06-23 | End: 2018-07-03

## 2018-06-29 NOTE — Clinical Note
Please arrange Level 2 u/s with MFM at [facility] site if possible. This is for AMA. I will sign the paper referral order later as needed.

## 2018-06-29 NOTE — MR AVS SNAPSHOT
After Visit Summary   6/29/2018    Monserrat White    MRN: [id number]           Patient Information     Date Of Birth          1980        Visit Information        Provider Department      6/29/2018 11:00 AM Saran Mccullough MD; [employer] [facility]        Today's Diagnoses     Gestational diabetes mellitus (GDM), antepartum        Prenatal care, subsequent pregnancy, unspecified trimester          Care Instructions                                                        If you have any questions regarding your visit, Please contact your care team.     [facility] Notice Kiosk Services: [phone]    [facility] CLINIC HOURS TELEPHONE NUMBER       Saran Mccullough M.D.      Beckie-      Prakash Bello-NITIN Mason-Medical Assistant       Monday-[place]  8:00a.m-4:45 p.m  [facility]  9:00a.m-11:45 a.m  Wednesday-[place] 8:00a.m-4:45 p.m.  Thursday[facility]  8:00a.m-4:45 p.m.  Friday[facility]  8:00a.m-4:45 p.m. [facility]  [address] [place]  [place] [address] [phone] ask [facility]  [phone] Fax  Imaging [phone]    [facility] Labor and Delivery  [address] [place] [address] [place] [address]  [phone]    [facility]  [address] [facility] [place] [address] [phone] [facility]  [phone] Fax  Imaging [phone]     Urgent Care locations:    [facility] Monday-Friday  5 pm - 9 pm  Saturday and Sunday   9 am - 5 pm    Monday-Friday   11 am - 9 pm  Saturday and Sunday   9 am - 5 pm   [phone]       If you need a medication refill, please contact your pharmacy. Please allow 3 business days for your refill to be completed.  As always, Thank you for trusting us with your healthcare needs!            Follow-ups after your visit        Your next 10 appointments already  scheduled     Jul 02, 2018  9:15 AM CDT   Diabetic Education with Milady Blakely RD   [facility] [place],  Diabetes Education ([facility])    [facility]note 2[facility]  [address] [place] [id number] [phone]            Jul 09, 2018 11:00 AM CDT   Diabetic Education with CP DIABETIC ED RESOURCE   [place] [facility] [place] ([facility])    [address] [facility] [id number] [phone]              Who to contact     If you have questions or need follow up information about today's clinic visit or your schedule please contact [facility] SILVIA WHITLOCK directly at [phone].  Normal or non-critical lab and imaging results will be communicated to you by MyChart, letter or phone within 4 business days after the clinic has received the results. If you do not hear from us within 7 days, please contact the clinic through MyChart or phone. If you have a critical or abnormal lab result, we will notify you by phone as soon as possible.  Submit refill requests through [employer] or call your pharmacy and they will forward the refill request to us. Please allow 3 business days for your refill to be completed.          Additional Information About Your Visit        Care EveryWhere ID     This is your Care EveryWhere ID. This could be used by other organizations to access your [place] medical records  [id number]        Your Vitals Were     Pulse Temperature Last Period Breastfeeding? BMI (Body Mass Index)       94 98.1  F (36.7  C) (Oral) 03/03/2018 (Exact Date) No 30.47 kg/m2        Blood Pressure from Last 3 Encounters:   06/29/18 110/70   06/22/18 111/71   05/16/18 116/77    Weight from Last 3 Encounters:   06/29/18 156 lb (70.8 kg)   06/22/18 154 lb 3.2 oz (69.9 kg)   05/16/18 153 lb (69.4 kg)              Today, you had the following     No orders found for display       Primary Care Provider  Office Phone # Fax #    Brandan Miller [phone]       [address] [facility] [address]        Equal Access to Services     MARILEE WHITMORE : Hadii aad ku hadnevilleluis Burton, waorenda yoandayanha, qarjta kaaddieda america, yifan burr allijose angel cohn laGaloperez fowler. So [facility] [phone].    ATENCIÓN: Si habla español, tiene a fernandez disposición servicios gratuitos de asistencia lingüística. Llame al [phone].    We comply with applicable federal civil rights laws and [place] laws. We do not discriminate on the basis of race, color, national origin, age, disability, sex, sexual orientation, or gender identity.            Thank you!     Thank you for choosing [facility]  for your care. Our goal is always to provide you with excellent care. Hearing back from our patients is one way we can continue to improve our services. Please take a few minutes to complete the written survey that you may receive in the mail after your visit with us. Thank you!             Your Updated Medication List - Protect others around you: Learn how to safely use, store and throw away your medicines at www.disposemymeds.org.          This list is accurate as of 6/29/18 11:19 AM.  Always use your most recent med list.                   Brand Name Dispense Instructions for use Diagnosis    cefuroxime 500 MG tablet    CEFTIN     Take 500 mg by mouth        cyclobenzaprine 10 MG tablet    FLEXERIL    30 tablet    Take 0.5 tablets (5 mg) by mouth 2 times daily as needed for muscle spasms    Episodic tension-type headache, not intractable       prenatal multivitamin plus iron 27-0.8 MG Tabs per tablet      Take 1 tablet by mouth daily        vitamin D 2000 units tablet     100 tablet    2 tabs daily for one month, then 1 tab daily    Vitamin D deficiency

## 2018-06-29 NOTE — PATIENT INSTRUCTIONS
If you have any questions regarding your visit, Please contact your care team.     [email or web address]: [phone]    [facility] CLINIC HOURS TELEPHONE NUMBER       Saran Mccullough M.D.      Beckie-      Prakash Mason-Medical Assistant       Monday-[place]  8:00a.m-4:45 p.m  Tuesday-[place]  9:00a.m-11:45 a.m  Wednesday-[place] 8:00a.m-4:45 p.m.  Thursday-[place]  8:00a.m-4:45 p.m.  Friday-[place]  8:00a.m-4:45 p.m. [facility]  [address] [place] [address]  [phone] ask for [facility]  [phone] Fax  Imaging [phone]    [facility] Labor and Delivery  [address] [place] [address] [place] [address]  [phone]    [facility]  [address] [place] [address]  [phone] ask [facility]  [phone] Fax  Imaging [phone]     Urgent Care locations:    [facility] Monday-Friday  5 pm - 9 pm  Saturday and Sunday   9 am - 5 pm    Monday-Friday   11 am - 9 pm  Saturday and Sunday   9 am - 5 pm   [phone]       If you need a medication refill, please contact your pharmacy. Please allow 3 business days for your refill to be completed.  As always, Thank you for trusting us with your healthcare needs!

## 2018-06-30 PROBLEM — O23.42: Status: ACTIVE | Noted: 2018-06-30

## 2018-06-30 NOTE — PROGRESS NOTES
No signif signs, symptoms or concerns except had recent [facility] [facility] and feeling better now on abx. Occasional epistaxis still but reduced. Has gest DM again and I suspect pre-existing type 2 DM. Advise diabetic nurse education visit and then Endocrinology E&M. Here with children and .  Discussed special diagnostic and screening tests and plans (y = yes, n = no/declined, u = uncertain/considering): quad scr = n, survey u/s = n, Level 2 survey u/s with MFM = y, CF carrier scr = n, hemoglobinopathy or thalassemia scr= n, SMA, fragile X  and other genetic scr= n, 1st trimester scr with NT and later AFP or with cell free DNA and later AFP = n, cell free DNA= n, genetic amnio/CVS = n.  Advice appropriate to gestational age reviewed including pertinent Checklist items. Discussed condition, tests and care plan including RBA. Problem list updated. Pap and UC next.  A/P:  Monserrat was seen today for prenatal care.    Diagnoses and all orders for this visit:    Gestational diabetes mellitus (GDM), antepartum, gestational diabetes method of control unspecified  -     Hemoglobin A1c  -     ENDOCRINOLOGY ADULT REFERRAL    Prenatal care, subsequent pregnancy, unspecified trimester  -     Hemoglobin A1c        Saran Mccullough MD

## 2018-07-02 ENCOUNTER — TELEPHONE (OUTPATIENT)
Dept: OBGYN | Facility: CLINIC | Age: 38
End: 2018-07-02

## 2018-07-02 ENCOUNTER — ALLIED HEALTH/NURSE VISIT (OUTPATIENT)
Dept: EDUCATION SERVICES | Facility: CLINIC | Age: 38
End: 2018-07-02
Attending: NUTRITIONIST
Payer: COMMERCIAL

## 2018-07-02 DIAGNOSIS — O24.419 GESTATIONAL DIABETES: Primary | ICD-10-CM

## 2018-07-02 PROCEDURE — T1013 SIGN LANG/ORAL INTERPRETER: HCPCS | Mod: U3

## 2018-07-02 PROCEDURE — G0108 DIAB MANAGE TRN  PER INDIV: HCPCS | Performed by: NUTRITIONIST

## 2018-07-02 NOTE — TELEPHONE ENCOUNTER
Referral signed & faxed to MFM along with patient's prenatal chart.  MFM will contact within 3 business days to schedule.

## 2018-07-02 NOTE — MR AVS SNAPSHOT
After Visit Summary   7/2/2018    Monserrat White    MRN: [id number]           Patient Information     Date Of Birth          1980        Visit Information        Provider Department      7/2/2018 9:15 AM Karen De La Rosa Mary M, RD [facility], [place],  Diabetes Education        Today's Diagnoses     Gestational diabetes    -  1      Care Instructions    1) Test your blood sugar before breakfast and one hour after each meal.  Glucose goals:  Before breakfast under 95  One hour after meals under 140  Call if you have three blood glucose readings above goal in one week.     2) Check your ketones every morning for one week.     3) Breakfast 30-45 grams (2-3 carbs)  Lunch 45-60 grams (3-4 carbs)  Dinner 45-60 grams (3-4 carbs)  Snacks 15-30 grams (1-2 carbs)    4) I will send a message to our Endocrinology team, you should be hearing from them about scheduling an appointment.             Follow-ups after your visit        Your next 10 appointments already scheduled     Jul 09, 2018 11:00 AM CDT   Diabetic Education with  DIABETIC ED RESOURCE   [facility] [place] ([facility])    [address] [place] [id number] [phone]              Who to contact     If you have questions or need follow up information about today's clinic visit or your schedule please contact [facility] directly at [phone].  Normal or non-critical lab and imaging results will be communicated to you by MyChart, letter or phone within 4 business days after the clinic has received the results. If you do not hear from us within 7 days, please contact the clinic through MyChart or phone. If you have a critical or abnormal lab result, we will notify you by phone as soon as possible.  Submit refill requests through u.sit or call your pharmacy and they will forward the refill request to us. Please allow 3 business  days for your refill to be completed.          Additional Information About Your Visit        Care EveryWhere ID     This is your Care EveryWhere ID. This could be used by other organizations to access your [facility] medical records  [id number]        Your Vitals Were     Last Period                   03/03/2018 (Exact Date)            Blood Pressure from Last 3 Encounters:   06/29/18 110/70   06/22/18 111/71   05/16/18 116/77    Weight from Last 3 Encounters:   06/29/18 70.8 kg (156 lb)   06/22/18 69.9 kg (154 lb 3.2 oz)   05/16/18 69.4 kg (153 lb)              Today, you had the following     No orders found for display         Today's Medication Changes          These changes are accurate as of 7/2/18 11:07 AM.  If you have any questions, ask your nurse or doctor.               Start taking these medicines.        Dose/Directions    acetone (Urine) test Strp   Used for:  Gestational diabetes   Started by:  Milady Blakely RD        Dose:  1 strip   1 strip by In Vitro route daily   Quantity:  20 each   Refills:  3       blood glucose monitoring lancets   Used for:  Gestational diabetes   Started by:  Milady Blakely RD        Use to test blood sugar 4 times daily or as directed.  Ok to substitute alternative if insurance prefers.   Quantity:  200 each   Refills:  11       blood glucose monitoring test strip   Commonly known as:  CONTOUR NEXT TEST   Used for:  Gestational diabetes   Started by:  Milady Blakely RD        Use to test blood sugar 4 times daily or as directed.   Quantity:  200 each   Refills:  11            Where to get your medicines      These medications were sent to [facility]/pharmacy #[id number] - [place] [id number] OF [address] [place] [address]     Phone:  [phone]     acetone (Urine) test Strp    blood glucose monitoring lancets    blood glucose monitoring test strip                Primary Care Provider Office Phone # Fax #    Brandan Miller MD  [phone]       [address] [facility] [address]        Equal Access to Services     MARILEE WHITMORE : Hadjordon aad ku hadselvin Burton, waorenda luqsandra, qarjta kaalmada america, yifan wooperez malcolm. So [facility] [phone].    ATENCIÓN: Si habla español, tiene a fernandez disposición servicios gratuitos de asistencia lingüística. Llame al [phone].    We comply with applicable federal civil rights laws and [place] laws. We do not discriminate on the basis of race, color, national origin, age, disability, sex, sexual orientation, or gender identity.            Thank you!     Thank you for choosing [facility],  DIABETES EDUCATION  for your care. Our goal is always to provide you with excellent care. Hearing back from our patients is one way we can continue to improve our services. Please take a few minutes to complete the written survey that you may receive in the mail after your visit with us. Thank you!             Your Updated Medication List - Protect others around you: Learn how to safely use, store and throw away your medicines at www.disposemymeds.org.          This list is accurate as of 7/2/18 11:07 AM.  Always use your most recent med list.                   Brand Name Dispense Instructions for use Diagnosis    acetone (Urine) test Strp     20 each    1 strip by In Vitro route daily    Gestational diabetes       blood glucose monitoring lancets     200 each    Use to test blood sugar 4 times daily or as directed.  Ok to substitute alternative if insurance prefers.    Gestational diabetes       blood glucose monitoring test strip    CONTOUR NEXT TEST    200 each    Use to test blood sugar 4 times daily or as directed.    Gestational diabetes       cefuroxime 500 MG tablet    CEFTIN     Take 500 mg by mouth        cyclobenzaprine 10 MG tablet    FLEXERIL    30 tablet    Take 0.5 tablets (5 mg) by mouth 2 times daily as needed for muscle spasms    Episodic  tension-type headache, not intractable       prenatal multivitamin plus iron 27-0.8 MG Tabs per tablet      Take 1 tablet by mouth daily        vitamin D 2000 units tablet     100 tablet    2 tabs daily for one month, then 1 tab daily    Vitamin D deficiency

## 2018-07-02 NOTE — PATIENT INSTRUCTIONS
1) Test your blood sugar before breakfast and one hour after each meal.  Glucose goals:  Before breakfast under 95  One hour after meals under 140  Call if you have three blood glucose readings above goal in one week.     2) Check your ketones every morning for one week.     3) Breakfast 30-45 grams (2-3 carbs)  Lunch 45-60 grams (3-4 carbs)  Dinner 45-60 grams (3-4 carbs)  Snacks 15-30 grams (1-2 carbs)    4) I will send a message to our Endocrinology team, you should be hearing from them about scheduling an appointment.

## 2018-07-09 ENCOUNTER — OFFICE VISIT (OUTPATIENT)
Dept: EDUCATION SERVICES | Facility: CLINIC | Age: 38
End: 2018-07-09
Attending: FAMILY MEDICINE
Payer: COMMERCIAL

## 2018-07-09 DIAGNOSIS — O24.419 GESTATIONAL DIABETES MELLITUS (GDM), ANTEPARTUM, GESTATIONAL DIABETES METHOD OF CONTROL UNSPECIFIED: Primary | ICD-10-CM

## 2018-07-09 PROCEDURE — T1013 SIGN LANG/ORAL INTERPRETER: HCPCS | Mod: U3 | Performed by: NUTRITIONIST

## 2018-07-09 RX ORDER — BLOOD-GLUCOSE METER
EACH MISCELLANEOUS
Qty: 1 KIT | Refills: 0 | Status: SHIPPED | OUTPATIENT
Start: 2018-07-09

## 2018-07-09 NOTE — PROGRESS NOTES
Diabetes Self-Management Training - Gestational Diabetes    SUBJECTIVE/OBJECTIVE:  Monserrat White presents today for education related to gestational diabetes.  She is accompanied by   Patient's emotional response to diabetes: expresses readiness to learn    LMP 2018 (Exact Date)    Estimated Date of Delivery: Dec 16, 2018  Patient is approximately 16 weeks gestation    Fasting Glucose  Lab Results   Component Value Date     2018       1 hour OGTT  Lab Results   Component Value Date    GLU1 176 (H) 2012   ]    3 hour OGTT    Fasting  Lab Results   Component Value Date     (H) 2018   ]    1 hour  Lab Results   Component Value Date    GL1 227 (H) 2018       2 hour  Lab Results   Component Value Date    GL2 258 (H) 2018   ]    3 hour  Lab Results   Component Value Date    GL3 199 (H) 2018       History   Smoking Status     Never Smoker   Smokeless Tobacco     Never Used       Lifestyle and Health Behaviors:  Physical Activity: patient states this is limited    Nutrition:  Patient is trying to eat more salads and fiber. Like choosing higher fiber breads and cereals. She is portioning out fruit.  She has also decreased her intake of chocolate, pasta, rice and sweet breads (will have just a bite).  Patient had GDM in her first pregnancy with her son who is now 16 years old and also with her last son.  She has a daughter who she did not have GDM when pregnant.  Patient states she was on insulin with her first son. The last pregnancy was diet controlled.  She can recall changes she made, what foods have carbohydrate, why it's important to control carb intake and meal planning.   Patient has a traditional mexican diet.   Other time(s) food is eaten? no  Beverages: She is not drinking any sweetened drinks. Does drink milk and water daily.  Cultural/Latter-day diet restrictions: No   Pre-Kvng Vitamin: Yes    Supplements: No   Experiencing nausea?  No      Socio/Economic/Cultural considerations:  Support System: family    Cultural Influences/Ethnic Background:   /     Health Beliefs and Attitudes:   Stage of Change: ACTION (Actively working towards change)    ASSESSMENT:  Patient has already made changes to her diet.   Needs to start glucose monitoring today and review of diet.    INTERVENTION:  Patient was instructed on Contour Next One meter and was able to provide an accurate return demonstration.     Educational topics covered today:  GDM diagnosis, pathophysiology, Risks and Complications of GDM, Means of controlling GDM, Using a Blood Glucose Monitor, Blood Glucose Goals, Logging and Interpreting Glucose Results, Ketone Testing, When to Call a Diabetes Educator or OB Provider, Healthy Eating During Pregnancy, Counting Carbohydrates, Meal Planning for GDM, and Physical Activity    Educational materials provided today:   Nancy Understanding Gestational Diabetes  Carbohydrate counting guide in [place]  GDM Log Book  Sharps Disposal  Contour Next One meter kit    Pt verbalized understanding of concepts discussed and recommendations provided today.     PLAN:  See AVS    Call/e-mail/MyChart message diabetes educator if 3 or more blood sugars are above the goal in 1 week or if ketones are positive.     Call/e-mail/MyChart message with questions/concerns.    FOLLOW-UP:  Follow up with diabetes educator in 1 week.    Time Spent: 60 minutes  Encounter type: Individual    Any diabetes medication dose changes were made via the CDE Protocol and Collaborative Practice Agreement with the patient's referring provider. A copy of this encounter was shared with the provider.

## 2018-07-09 NOTE — PROGRESS NOTES
Gestational Diabetes Follow-up Visit    SUBJECTIVE/OBJECTIVE:  Monserrat White presents today for education and evaluation of glucose control related to gestational diabetes  Patient is approximately 17 weeks gestation   She is accompanied by daughter and   Patient's gestational diabetes management related comments/concerns: Patient states that her insurance doesn't cover the glucose test strips for the meter I gave her at our last appointment. The pharmacy gave her OneTouch Ultra Blue test strips and Delica lancets but told her not to use them with the Kelechi meter and to ask me about getting a new meter. Therefore, she has glucose data from 7/2-7/4 only.  I placed an order to her preferred pharmacy for One Touch Ultra 2 meter and provided her with a One Touch Delica Lancing device to use with her lancets. I also provided her with 50 Kelechi Contour Next Test strips. She can use those until she has time to  her One Touch meter to use with the Ultra strips.  Patient is concerned about her morning glucose readings being elevated. She has some questions today about insulin, all of which were answered.  She did not get ketone strips from the pharmacy.  LMP 03/03/2018 (Exact Date)      Blood Glucose/Ketone Log: Fasting and one hour after meals  Date Ketones Fasting Post Breakfast Post Lunch Post Supper   7/2  116  101 130   7/3  123 150 130    7/4  112 124  129           7/9   144 (2h post breakfast today during appointment)                                         Current gestational diabetes management:    Taking medications for gestational diabetes? No  Patient is willing to start insulin to aid in glucose control.    Do you have any difficulty affording your medications or glucose monitoring supplies?   No    Physical Activity: light walking daily    Nutrition:  Patient states her appetite is somewhat low. No n/v. That is why she sometimes will only test three times daily as she has only had  two meals.   Her first meal is usually around 10-11am. She had a whole wheat sandwich with avocado, cheese and lettuce today.  If she has two meals she does have snacks during the day as well and could test after one snack. Snacks are usually 1 cup milk, fruit.  For dinner yesterday she had two corn tortilla with chorizo and potato.    ASSESSMENT:  Fasting glucose is elevated above goal.     Health Beliefs and Attitudes:   Stage of Change: ACTION (Actively working towards change)    INTERVENTION:  Educational topics covered today:  When to Call a Diabetes Educator or OB Provider  Reinforced meal plan today  Reinforced glucose testing four times daily.  Insulin injection technique taught using a Pen for Levemir - 7 units and NPH - 7 units at bedtime. Recommended dose was based on weight per protocol. Patient verbalized understanding and was able to perform an accurate return demonstration of injection technique.  Discussed mixing insulin, storage, sharps, new needle each use, site selection, action of insulin and hypoglycemia signs, symptoms and treatment.      Educational Materials provided today:  Medication Information - Levemir and Humulin N/NPH     PLAN:  Check glucose 4 times daily. Fasting and one hour after meals.  Continue to follow recommended meal plan: work on getting three meals per day. If you can't, test once after a snack.  Follow consistent CHO meal plan, eat CHO and protein/fat at all meals/snacks.  Message sent to Endocrinology Team to assist in scheduling patient with Endocrinology. Endocrinology referral placed by OB provider.     FOLLOW-UP:  3-4 days after starting insulin    Time spent was 60 minutes  Encounter type: Individual    Any diabetes medication dose changes were made via the CDE Protocol and Collaborative Practice Agreement with the patient's referring provider and OB/GYN provider. A copy of this encounter was shared with the provider.

## 2018-07-09 NOTE — MR AVS SNAPSHOT
"              After Visit Summary   7/9/2018    Monserrat White    MRN: [id number]           Patient Information     Date Of Birth          1980        Visit Information        Provider Department      7/9/2018 12:15 PM Milady Blakely RD; [place] [facility], [place],  Diabetes Education        Today's Diagnoses     Gestational diabetes mellitus (GDM), antepartum, gestational diabetes method of control unspecified    -  1      Care Instructions    1) Continue to follow your meal plan.    2) Check your blood glucose fasting and one hour after each meal.    Taking Insulin:    1. Your doctor will tell you the type and amount of insulin to take.     - Do a 2 unit \"prime\" before each injection, be sure a stream of insulin comes out of the needle before you give your injection.    - After you inject, hold the needle under the skin to the count of 10 to be sure all of the insulin goes in.     - Rotate injection sites, keeping at least 1 inch apart from last injection site and 2 inches away from belly button or surgical scars.    2. Pen - Use a new pen needle for each injection. Always remove pen needle from the insulin pen after use and do not store insulin pens with the needle on the pen.     3. Store insulin you are not using in the refrigerator (do not freeze). Take new insulin out of the refrigerator a few hours prior to use to bring to room temperature.     4. Once opened Levemir can be kept at room temperature for 42 days after opened. and NPH Pens (Humulin N or Novolin N) can be kept at room temperature for 14 days after opened.. Do not use the opened insulin after this time has passed, even if there is still medicine inside.     5. Always carry your blood sugar meter and a sugar source like glucose tablets with you in case of a low blood sugar. Treat a low blood sugar (less than 70) with 15 grams of carbohydrate (1 carb choice). Wait 15 minutes, recheck blood sugar. If blood sugar is " still below 70, repeat the treatment.    6. Wear Medical ID or carry a wallet card stating you have Diabetes.    7. Call your doctor or diabetes educator if you begin having low blood sugars or if you have questions or concerns.                 Follow-ups after your visit        Who to contact     If you have questions or need follow up information about today's clinic visit or your schedule please contact [facility] directly at [phone].  Normal or non-critical lab and imaging results will be communicated to you by [facility] [employer], letter or phone within 4 business days after the clinic has received the results. If you do not hear from us within 7 days, please contact the clinic through [facility] [employer] or phone. If you have a critical or abnormal lab result, we will notify you by phone as soon as possible.  Submit refill requests through We[employer] or call your pharmacy and they will forward the refill request to us. Please allow 3 business days for your refill to be completed.          Additional Information About Your Visit        Care EveryWhere ID     This is your Care EveryWhere ID. This could be used by other organizations to access your [facility] medical records  [id number]        Your Vitals Were     Last Period                   03/03/2018 (Exact Date)            Blood Pressure from Last 3 Encounters:   06/29/18 110/70   06/22/18 111/71   05/16/18 116/77    Weight from Last 3 Encounters:   06/29/18 70.8 kg (156 lb)   06/22/18 69.9 kg (154 lb 3.2 oz)   05/16/18 69.4 kg (153 lb)              Today, you had the following     No orders found for display         Today's Medication Changes          These changes are accurate as of 7/9/18  1:23 PM.  If you have any questions, ask your nurse or doctor.               Start taking these medicines.        Dose/Directions    blood glucose monitoring meter device kit   Used for:  Gestational diabetes mellitus (GDM), antepartum, gestational diabetes method of  control unspecified        Use to test blood sugar 4 times daily or as directed.   Quantity:  1 kit   Refills:  0            Where to get your medicines      These medications were sent to [id number] - [facility] [address] [facility] [address]     Phone:  [phone]     blood glucose monitoring meter device kit                Primary Care Provider Office Phone # Fax #    Brandan Miller [phone] [address] [facility] [address]        Equal Access to Services     [facility]: Hadii aad ku hadasho Soomaali, waaxda luqadaha, qaybta kaalmada adeegyada, waxay idiin hayaan adeeg lalit hurtado . So [facility] [phone].    ATENCIÓN: Si habla español, tiene a fernandez disposición servicios gratuitos de asistencia lingüística. [facility] [phone].    We comply with applicable federal civil rights laws and [place] laws. We do not discriminate on the basis of race, color, national origin, age, disability, sex, sexual orientation, or gender identity.            Thank you!     Thank you for choosing [facility]  DIABETES EDUCATION  for your care. Our goal is always to provide you with excellent care. Hearing back from our patients is one way we can continue to improve our services. Please take a few minutes to complete the written survey that you may receive in the mail after your visit with us. Thank you!             Your Updated Medication List - Protect others around you: Learn how to safely use, store and throw away your medicines at www.disposemymeds.org.          This list is accurate as of 7/9/18  1:23 PM.  Always use your most recent med list.                   Brand Name Dispense Instructions for use Diagnosis    acetone (Urine) test Strp     20 each    1 strip by In Vitro route daily    Gestational diabetes       blood glucose monitoring lancets     200 each    Use to test blood sugar 4 times daily or as directed.   Ok to substitute alternative if insurance prefers.    Gestational diabetes       blood glucose monitoring meter device kit     1 kit    Use to test blood sugar 4 times daily or as directed.    Gestational diabetes mellitus (GDM), antepartum, gestational diabetes method of control unspecified       blood glucose monitoring test strip    CONTOUR NEXT TEST    200 each    Use to test blood sugar 4 times daily or as directed.    Gestational diabetes       cyclobenzaprine 10 MG tablet    FLEXERIL    30 tablet    Take 0.5 tablets (5 mg) by mouth 2 times daily as needed for muscle spasms    Episodic tension-type headache, not intractable       prenatal multivitamin plus iron 27-0.8 MG Tabs per tablet      Take 1 tablet by mouth daily        vitamin D 2000 units tablet     100 tablet    2 tabs daily for one month, then 1 tab daily    Vitamin D deficiency

## 2018-07-19 ENCOUNTER — PRENATAL OFFICE VISIT (OUTPATIENT)
Dept: OBGYN | Facility: CLINIC | Age: 38
End: 2018-07-19
Payer: COMMERCIAL

## 2018-07-19 VITALS
SYSTOLIC BLOOD PRESSURE: 104 MMHG | HEART RATE: 84 BPM | DIASTOLIC BLOOD PRESSURE: 68 MMHG | BODY MASS INDEX: 30.47 KG/M2 | WEIGHT: 156 LBS | TEMPERATURE: 98.3 F

## 2018-07-19 DIAGNOSIS — O23.42 INFECTION OF URINARY TRACT IN PREGNANCY IN SECOND TRIMESTER: ICD-10-CM

## 2018-07-19 DIAGNOSIS — O24.410 DIET CONTROLLED GESTATIONAL DIABETES MELLITUS (GDM), ANTEPARTUM: ICD-10-CM

## 2018-07-19 DIAGNOSIS — Z34.80 PRENATAL CARE, SUBSEQUENT PREGNANCY, UNSPECIFIED TRIMESTER: ICD-10-CM

## 2018-07-19 PROCEDURE — G0476 HPV COMBO ASSAY CA SCREEN: HCPCS | Performed by: OBSTETRICS & GYNECOLOGY

## 2018-07-19 PROCEDURE — 87086 URINE CULTURE/COLONY COUNT: CPT | Performed by: OBSTETRICS & GYNECOLOGY

## 2018-07-19 PROCEDURE — G0145 SCR C/V CYTO,THINLAYER,RESCR: HCPCS | Performed by: OBSTETRICS & GYNECOLOGY

## 2018-07-19 PROCEDURE — 99207 ZZC PRENATAL VISIT: CPT | Performed by: OBSTETRICS & GYNECOLOGY

## 2018-07-19 PROCEDURE — T1013 SIGN LANG/ORAL INTERPRETER: HCPCS | Mod: U3 | Performed by: OBSTETRICS & GYNECOLOGY

## 2018-07-19 NOTE — PATIENT INSTRUCTIONS
If you have any questions regarding your visit, Please contact your care team.     [facility]: [phone]    Women s Health CLINIC HOURS TELEPHONE NUMBER       NAVID Harris-      Prakash Mason-Medical Assistant       Monday-[place]  8:00a.m-4:45 p.m  Tuesday-[place]  9:00a.m-11:45 a.m  Wednesday-[place]sabel Ramirez 8:00a.m-4:45 p.m.  Thursday-[place]  8:00a.m-4:45 p.m.  Friday-[place]  8:00a.m-4:45 p.m. [facility]  [address] [place] [address]  [phone] ask [facility]  [phone] Fax  Imaging [phone]    [facility] Labor and Delivery  [address] [place] [address] [place] [address]  [phone]    [facility]  [address] [place] [address]  [phone] [facility]  [phone] Fax  Imaging [phone]     Urgent Care locations:    [place]        [place] Monday-Friday  5 pm - 9 pm  Saturday and Sunday   9 am - 5 pm    Monday-Friday   11 am - 9 pm  Saturday and Sunday   9 am - 5 pm   [phone]       If you need a medication refill, please contact your pharmacy. Please allow 3 business days for your refill to be completed.  As always, Thank you for trusting us with your healthcare needs!

## 2018-07-19 NOTE — MR AVS SNAPSHOT
After Visit Summary   7/19/2018    Monserrat White    MRN: [id number]           Patient Information     Date Of Birth          1980        Visit Information        Provider Department      7/19/2018 9:30 AM Saran Mccullough MD; [place] [employer] [facility]        Today's Diagnoses     Infection of urinary tract in pregnancy in second trimester        Gestational diabetes mellitus (GDM), antepartum        Prenatal care, subsequent pregnancy, unspecified trimester          Care Instructions                                                        If you have any questions regarding your visit, Please contact your care team.     [facility]: [phone]    [facility] CLINIC HOURS TELEPHONE NUMBER       Saran Mccullough M.D.      Beckie-      Prakash Mason-Medical Assistant       Monday-[place]  8:00a.m-4:45 p.m  Tuesday-[place]  9:00a.m-11:45 a.m  Wednesday-[place] 8:00a.m-4:45 p.m.  Thursday-[place]  8:00a.m-4:45 p.m.  Friday-[place]  8:00a.m-4:45 p.m. [facility]  [address]  [place] [address] [phone] [facility]  [phone] Fax  Imaging [phone]    [facility] Labor and Delivery  [address] [place] [address] Dr.  [place], [place] [address] [phone]    [facility]  [address]  [place] [address] [phone] [facility]  [phone] Fax  Imaging [phone]     Urgent Care locations:    [facility] Monday-Friday  5 pm - 9 pm  Saturday and Sunday   9 am - 5 pm    Monday-Friday   11 am - 9 pm  Saturday and Sunday   9 am - 5 pm   [phone]       If you need a medication refill, please contact your pharmacy. Please allow 3 business days for your refill to be completed.  As always, Thank you for trusting us with your healthcare needs!            Follow-ups  after your visit        Your next 10 appointments already scheduled     Jul 20, 2018 11:45 AM CDT   New Patient Visit with Stefani Hogan MD   [facility] Endocrinology ([facility] [place])    [address]  [address] [facility] [address] [phone]              Who to contact     If you have questions or need follow up information about today's clinic visit or your schedule please contact [facility] directly at [phone].  Normal or non-critical lab and imaging results will be communicated to you by MyChart, letter or phone within 4 business days after the clinic has received the results. If you do not hear from us within 7 days, please contact the clinic through MyChart or phone. If you have a critical or abnormal lab result, we will notify you by phone as soon as possible.  Submit refill requests through [employer] or call your pharmacy and they will forward the refill request to us. Please allow 3 business days for your refill to be completed.          Additional Information About Your Visit        Care EveryWhere ID     This is your Care EveryWhere ID. This could be used by other organizations to access your [place] medical records  [id number]        Your Vitals Were     Pulse Temperature Last Period Breastfeeding? BMI (Body Mass Index)       84 98.3  F (36.8  C) (Oral) 03/03/2018 (Exact Date) No 30.47 kg/m2        Blood Pressure from Last 3 Encounters:   07/19/18 104/68   06/29/18 110/70   06/22/18 111/71    Weight from Last 3 Encounters:   07/19/18 156 lb (70.8 kg)   06/29/18 156 lb (70.8 kg)   06/22/18 154 lb 3.2 oz (69.9 kg)              Today, you had the following     No orders found for display       Primary Care Provider Office Phone # Fax #    Brandan Miller [phone]       [address] [facility] [address]        Equal Access to Services     MARILEE WHITMORE : cade Steele,  sandi mae alliyifan samuels nilain hayaan adeeg kharash la'aan ah. So [facility] [phone].    ATENCIÓN: Si barry hernandez, tiene a fernandez disposición servicios gratuitos de asistencia lingüística. Ekaterina al [phone].    We comply with applicable federal civil rights laws and [place] laws. We do not discriminate on the basis of race, color, national origin, age, disability, sex, sexual orientation, or gender identity.            Thank you!     Thank you for choosing [facility]  for your care. Our goal is always to provide you with excellent care. Hearing back from our patients is one way we can continue to improve our services. Please take a few minutes to complete the written survey that you may receive in the mail after your visit with us. Thank you!             Your Updated Medication List - Protect others around you: Learn how to safely use, store and throw away your medicines at www.disposemymeds.org.          This list is accurate as of 7/19/18  9:53 AM.  Always use your most recent med list.                   Brand Name Dispense Instructions for use Diagnosis    acetone (Urine) test Strp     20 each    1 strip by In Vitro route daily    Gestational diabetes       blood glucose monitoring lancets     200 each    Use to test blood sugar 4 times daily or as directed.  Ok to substitute alternative if insurance prefers.    Gestational diabetes       blood glucose monitoring meter device kit     1 kit    Use to test blood sugar 4 times daily or as directed.    Gestational diabetes mellitus (GDM), antepartum, gestational diabetes method of control unspecified       blood glucose monitoring test strip    CONTOUR NEXT TEST    200 each    Use to test blood sugar 4 times daily or as directed.    Gestational diabetes       cyclobenzaprine 10 MG tablet    FLEXERIL    30 tablet    Take 0.5 tablets (5 mg) by mouth 2 times daily as needed for muscle spasms    Episodic tension-type headache, not  intractable       prenatal multivitamin plus iron 27-0.8 MG Tabs per tablet      Take 1 tablet by mouth daily        vitamin D 2000 units tablet     100 tablet    2 tabs daily for one month, then 1 tab daily    Vitamin D deficiency

## 2018-07-19 NOTE — LETTER
July 27, 2018    Monserrat Finezquez  [address]  [facility] [address]    Dear Monserrat,  We are happy to inform you that your PAP smear result from 07/19/18 is normal.  We are now able to do a follow up test on PAP smears. The DNA test is for HPV (Human Papilloma Virus). Cervical cancer is closely linked with certain types of HPV. Your results showed no evidence of high risk HPV.  Therefore we recommend you return in 5 years for your next pap smear and HPV test.  You will still need to return to the clinic every year for an annual exam and other preventive tests.  Please contact the clinic at [phone] with any questions.  Sincerely,    Saran Mccullough MD/[facility]

## 2018-07-20 ENCOUNTER — OFFICE VISIT (OUTPATIENT)
Dept: ENDOCRINOLOGY | Facility: CLINIC | Age: 38
End: 2018-07-20
Payer: COMMERCIAL

## 2018-07-20 VITALS
WEIGHT: 156.31 LBS | HEART RATE: 85 BPM | BODY MASS INDEX: 30.69 KG/M2 | HEIGHT: 60 IN | DIASTOLIC BLOOD PRESSURE: 67 MMHG | SYSTOLIC BLOOD PRESSURE: 105 MMHG

## 2018-07-20 DIAGNOSIS — O24.414 GESTATIONAL DIABETES MELLITUS (GDM) REQUIRING INSULIN: Primary | ICD-10-CM

## 2018-07-20 LAB
BACTERIA SPEC CULT: NORMAL
SPECIMEN SOURCE: NORMAL

## 2018-07-20 ASSESSMENT — PAIN SCALES - GENERAL: PAINLEVEL: NO PAIN (0)

## 2018-07-20 NOTE — PROGRESS NOTES
No signif signs, symptoms or concerns. Plans Endocrinology insulin management and MFM for Level 2 ultrasound. Small cervical polyp noted on exam for Pap/HRHPV- observe. Here with daughter and . Advice appropriate to gestational age reviewed including pertinent Checklist items. Discussed condition, tests and care plan including RBA. Problem list updated.   A/P:  Monserrat was seen today for prenatal care.    Diagnoses and all orders for this visit:    Infection of urinary tract in pregnancy in second trimester  -     Urine Culture Aerobic Bacterial    Diet controlled gestational diabetes mellitus (GDM), antepartum    Prenatal care, subsequent pregnancy, unspecified trimester  -     Urine Culture Aerobic Bacterial  -     Pap imaged thin layer screen with HPV - recommended age 30 - 65 years (select HPV order below)  -     HPV High Risk Types DNA Cervical        Saran Mccullough MD

## 2018-07-20 NOTE — PROGRESS NOTES
The patient is seen in consultation at the request of Dr Mccullough for evaluation of diabetes in pregnancy. She is seen with the help of a .     Monserrat White is a 38 year old female diagnosed with diabetes during the 12th week of pregnancy.  She is now 18 weeks pregnant.  The estimated delivery date is 12/16/18.  This is her fifth pregnancy.  With her first pregnancy, 16 years ago, she was diagnosed with diabetes (tx with insulin) and she lost the baby at 4 months.  She also had a miscarriage at 12 weeks.  Last pregnancy was 5 years ago and she did not develop gestational diabetes at that time.  Following last delivery, the patient remembers having the blood glucose checked for approximately 6 months and it was normal.    A1c checked in 2014 was 5.9, suggesting prediabetes.  On 6/29/18, A1c was 5.7.     Her meals are at different times of the day, depending on her appetite.  In general, she has breakfast around noon and dinner around 6-7 PM and 2-3 snacks (half a cup of milk in the morning, when she wakes up, food around 2 PM and before or after dinner).     The glucometer readings reveal she has been checking her blood sugar 3 times daily, before breakfast, 1 hour after breakfast, sometimes 1 hour after snacks, 1 hour after dinner or at bedtime.  Her morning blood sugar is consistently elevated, with an average blood sugar around 118.  Postprandial blood glucose numbers is at target most of the time, with occasional hyperglycemic episodes, scattered throughout the day.  Average blood glucose is 126 with a standard deviation of 19 and a range variable between 99 and 190.    Past Medical History   Past Medical History:   Diagnosis Date     Arachnoid cyst 2012     Impaired glucose tolerance 2012     TB lung, latent 2011    INH treatment; CXR neg     Past Surgical History   Past Surgical History:   Procedure Laterality Date     C REGULAR ECHO (FL)  2013    normal (murmur benign)      CHOLECYSTECTOMY, LAPOROSCOPIC  2005     DILATION AND CURETTAGE SUCTION  2010     DILATION AND CURETTAGE SUCTION  1/31/2013    Procedure: DILATION AND CURETTAGE SUCTION;  Suction D&C, mac anesthesia, para cervical block;  Surgeon: Saran Mccullough MD;  Location: [facility] OR     ENDOSCOPY  2010    neg     HC INSERTION INTRAUTERINE DEVICE  2008    Mirena     HC REMOVE INTRAUTERINE DEVICE  2008     Current Medications  Prescription Medications as of 7/20/2018             acetone, Urine, test STRP 1 strip by In Vitro route daily    blood glucose monitoring (JOE MICROLET) lancets Use to test blood sugar 4 times daily or as directed.  Ok to substitute alternative if insurance prefers.    blood glucose monitoring (CONTOUR NEXT TEST) test strip Use to test blood sugar 4 times daily or as directed.    blood glucose monitoring (ONE TOUCH ULTRA 2) meter device kit Use to test blood sugar 4 times daily or as directed.    Cholecalciferol (VITAMIN D) 2000 UNITS tablet 2 tabs daily for one month, then 1 tab daily    Prenatal Vit-Fe Fumarate-FA (PRENATAL MULTIVITAMIN PLUS IRON) 27-0.8 MG TABS per tablet Take 1 tablet by mouth daily    cyclobenzaprine (FLEXERIL) 10 MG tablet Take 0.5 tablets (5 mg) by mouth 2 times daily as needed for muscle spasms          Family History   Problem Relation Age of Onset     Hypertension Mother      Diabetes Brother      Cerebrovascular Disease Brother      Alzheimer Disease Father 78     Alzheimer Disease Maternal Grandmother      unknown   Brother diagnosed with type 2 diabetes. Father HTN. Aunt - cervical cancer.     Social History   with 4 children. She denies smoking, drinking alcohol or using illicit drugs. Occupation: unemployed.     Review of Systems   Systemic:             No sign fatigue   Eye:                      No eye symptoms   Donny-Laryngeal:     No donny-laryngeal symptoms, no dysphagia, no hoarseness, no cough     Breast:                  No breast symptoms  Cardiovascular:    No  cardiovascular symptoms, no CP or palpitations   Pulmonary:           No pulmonary symptoms, no SOB or cough    Gastrointestinal:   No gastrointestinal symptoms, no diarrhea or constipation   Genitourinary:       Some increased thirst and urination   Endocrine:            No endocrine symptoms, no cold or heat intolerance   Neurological:        occasional headaches, no tremor, no numbness or tingling sensation, no dizziness   Musculoskeletal:  No musculoskeletal symptoms, no muscle or joint pain   Skin:                     No skin symptoms, no dry skin, no hair falling out   Psychological:     No psychological symptoms                 Vital Signs     Previous Weights:    Wt Readings from Last 10 Encounters:   07/20/18 70.9 kg (156 lb 4.9 oz)   07/19/18 70.8 kg (156 lb)   06/29/18 70.8 kg (156 lb)   06/22/18 69.9 kg (154 lb 3.2 oz)   05/16/18 69.4 kg (153 lb)   04/23/18 71.6 kg (157 lb 12.8 oz)   01/22/15 68.5 kg (151 lb)   12/11/14 67.8 kg (149 lb 8 oz)   11/19/14 68.3 kg (150 lb 8 oz)   10/02/14 69.1 kg (152 lb 6.4 oz)        /67  Pulse 85  Ht 1.524 m (5')  Wt 70.9 kg (156 lb 4.9 oz)  LMP 03/03/2018 (Exact Date)  BMI 30.53 kg/m2    Physical Exam  General Appearance: she is well developed, well nourished and in no distress     Eyes:  conjutivae and extra-ocular motions are normal.                                    pupils round and reactive to light, no lid lag, no stare    HEENT:   oropharynx clear and moist, no JVD, no bruits     Thyroid mildly prominent, normal for pregnancy, no palpable nodules   Cardiovascular:  regular rhythm, no murmurs, distal pulse palpable, no edema  Respiratory:        chest clear, no rales, no rhonchi   Gastrointestinal:  abdomen pregnant, benign looking abdominal striae  Musculoskeletal:  normal tone and strength  Psychological:          affect and judgment normal  Skin:  warm, no lesions  Neurological:  reflexes normal and symmetric, no resting tremor.     Lab Results  I  reviewed prior lab results documented in Epic.  TSH   Date Value Ref Range Status   12/08/2014 2.25 0.40 - 4.00 mU/L Final     Comment:     Effective 7/30/2014, the reference range for this assay has changed to reflect   new instrumentation/methodology.     08/03/2012 1.19 0.4 - 5.0 mU/L Final   10/06/2011 1.62 0.4 - 5.0 mU/L Final   06/10/2010 0.81 0.4 - 5.0 mU/L Final     Lab Results   Component Value Date    A1C 5.7 06/29/2018    A1C 5.9 12/08/2014    A1C 5.5 12/14/2012    A1C 5.7 07/26/2012    A1C 5.5 02/29/2012       Assessment     Diabetes diagnosed in the second trimester, in a 38-year-old female with a history of gestational diabetes and possible prediabetes prior to this pregnancy.  She has been experiencing persistent fasting hyperglycemia. Most of the postprandial blood glucose numbers are at target.    Pregnancy BG targets were discussed with the patient:  Fasting blood glucose concentration ?95 mg/dL  One-hour postprandial blood glucose concentration ?140 mg/dL  Two-hour postprandial glucose concentration ?120 mg/dL  A1C ?6 percent    Counseled on the importance of obtaining a good glycemic control during pregnancy in order to decrease the risk of complications like macrosomia, preeclampsia, premature delivery etc.    Recommendations:  Start 7 units NPH at bedtime  Check BG before breakfast, 1 hrs after meals and before dinner.  For larger snacks, also check the 1 hour postprandial blood glucose.  Have the numbers sent to us in 1 week.     No orders of the defined types were placed in this encounter.

## 2018-07-20 NOTE — MR AVS SNAPSHOT
After Visit Summary   7/20/2018    Monserrat White    MRN: [id number]           Patient Information     Date Of Birth          1980        Visit Information        Provider Department      7/20/2018 11:45 AM Stefani Hogan MD; [facility] Endocrinology        Today's Diagnoses     Gestational diabetes mellitus (GDM) requiring insulin    -  1      Care Instructions    Blood glucose goals in diabetic pregnant woman: premeal BG < 95, 1 hr postprandial < 140, HbA1c <6%.  Administer 7 U insulin at bedtime   Check BG in the morning, 1 hr after meals and before dinner. For larger snacks - also check BG 1 hr after the snack   Send BG numbers in 1 week                 Follow-ups after your visit        Follow-up notes from your care team     Return in about 4 weeks (around 8/17/2018).      Your next 10 appointments already scheduled     Aug 13, 2018 11:00 AM CDT   (Arrive by 10:45 AM)   RETURN DIABETES with Stefani Hogan MD   [facility] Endocrinology ([facility] [place])    [address] [address] [phone]              Who to contact     Please call your clinic at [phone] to:    Ask questions about your health    Make or cancel appointments    Discuss your medicines    Learn about your test results    Speak to your doctor            Additional Information About Your Visit        Care EveryWhere ID     This is your Care EveryWhere ID. This could be used by other organizations to access your [place] medical records  [id number]        Your Vitals Were     Pulse Height Last Period BMI (Body Mass Index)          85 1.524 m (5') 03/03/2018 (Exact Date) 30.53 kg/m2         Blood Pressure from Last 3 Encounters:   07/20/18 105/67   07/19/18 104/68   06/29/18 110/70    Weight from Last 3 Encounters:   07/20/18 70.9 kg (156 lb 4.9 oz)   07/19/18 70.8 kg (156 lb)   06/29/18 70.8 kg (156 lb)               Today, you had the following     No orders found for display         Today's Medication Changes          These changes are accurate as of 7/20/18  7:00 PM.  If you have any questions, ask your nurse or doctor.               Start taking these medicines.        Dose/Directions    insulin isophane human 100 UNIT/ML injection   Commonly known as:  HumuLIN N KWIKPEN   Used for:  Gestational diabetes mellitus (GDM) requiring insulin   Started by:  Stefani Hogan MD        Dose:  7 Units   Inject 7 Units Subcutaneous At Bedtime   Quantity:  9 mL   Refills:  1       insulin pen needle 32G X 4 MM   Commonly known as:  BD BHANU U/F   Started by:  Stefani Hogan MD        Use 1 pen needles daily or as directed.   Quantity:  100 each   Refills:  3            Where to get your medicines      These medications were sent to [facility]/pharmacy #[id number] - [place], [place] - [address] [facility] [address]     Phone:  [phone]     insulin isophane human 100 UNIT/ML injection    insulin pen needle 32G X 4 MM                Primary Care Provider Office Phone # Fax #    rBandan Miller [phone]       [address] [facility] [address]        Equal Access to Services     MARILEE WHITMORE AH: Hadii paige moyo Sopavel, waaxda luqadaha, qaybta kaalmada adeegyada, yifan fowler. So [facility] [phone].    ATENCIÓN: Si habla español, tiene a fernandez disposición servicios gratuitos de asistencia lingüística. Llame al [phone].    We comply with applicable federal civil rights laws and [place] laws. We do not discriminate on the basis of race, color, national origin, age, disability, sex, sexual orientation, or gender identity.            Thank you!     Thank you for choosing [facility] ENDOCRINOLOGY  for your care. Our goal is always to provide you with excellent care. Hearing back from our patients is one way we can continue  to improve our services. Please take a few minutes to complete the written survey that you may receive in the mail after your visit with us. Thank you!             Your Updated Medication List - Protect others around you: Learn how to safely use, store and throw away your medicines at www.disposemymeds.org.          This list is accurate as of 7/20/18  7:00 PM.  Always use your most recent med list.                   Brand Name Dispense Instructions for use Diagnosis    acetone (Urine) test Strp     20 each    1 strip by In Vitro route daily    Gestational diabetes       blood glucose monitoring lancets     200 each    Use to test blood sugar 4 times daily or as directed.  Ok to substitute alternative if insurance prefers.    Gestational diabetes       blood glucose monitoring meter device kit     1 kit    Use to test blood sugar 4 times daily or as directed.    Gestational diabetes mellitus (GDM), antepartum, gestational diabetes method of control unspecified       blood glucose monitoring test strip    CONTOUR NEXT TEST    200 each    Use to test blood sugar 4 times daily or as directed.    Gestational diabetes       cyclobenzaprine 10 MG tablet    FLEXERIL    30 tablet    Take 0.5 tablets (5 mg) by mouth 2 times daily as needed for muscle spasms    Episodic tension-type headache, not intractable       insulin isophane human 100 UNIT/ML injection    HumuLIN N KWIKPEN    9 mL    Inject 7 Units Subcutaneous At Bedtime    Gestational diabetes mellitus (GDM) requiring insulin       insulin pen needle 32G X 4 MM    BD BHANU U/F    100 each    Use 1 pen needles daily or as directed.        prenatal multivitamin plus iron 27-0.8 MG Tabs per tablet      Take 1 tablet by mouth daily        vitamin D 2000 units tablet     100 tablet    2 tabs daily for one month, then 1 tab daily    Vitamin D deficiency

## 2018-07-20 NOTE — LETTER
7/20/2018       RE: Monserrat White  [address]  [facility] [address]     Dear Colleague,    Thank you for referring your patient, Monserrat White, to the [facility] ENDOCRINOLOGY at [facility]. Please see a copy of my visit note below.      The patient is seen in consultation at the request of Dr Mccullough for evaluation of diabetes in pregnancy. She is seen with the help of a .     Monserrat White is a 38 year old female diagnosed with diabetes during the 12th week of pregnancy.  She is now 18 weeks pregnant.  The estimated delivery date is 12/16/18.  This is her fifth pregnancy.  With her first pregnancy, 16 years ago, she was diagnosed with diabetes (tx with insulin) and she lost the baby at 4 months.  She also had a miscarriage at 12 weeks.  Last pregnancy was 5 years ago and she did not develop gestational diabetes at that time.  Following last delivery, the patient remembers having the blood glucose checked for approximately 6 months and it was normal.    A1c checked in 2014 was 5.9, suggesting prediabetes.  On 6/29/18, A1c was 5.7.     Her meals are at different times of the day, depending on her appetite.  In general, she has breakfast around noon and dinner around 6-7 PM and 2-3 snacks (half a cup of milk in the morning, when she wakes up, food around 2 PM and before or after dinner).     The glucometer readings reveal she has been checking her blood sugar 3 times daily, before breakfast, 1 hour after breakfast, sometimes 1 hour after snacks, 1 hour after dinner or at bedtime.  Her morning blood sugar is consistently elevated, with an average blood sugar around 118.  Postprandial blood glucose numbers is at target most of the time, with occasional hyperglycemic episodes, scattered throughout the day.  Average blood glucose is 126 with a standard deviation of 19 and a range variable between 99 and 190.    Past  Medical History   Past Medical History:   Diagnosis Date     Arachnoid cyst 2012     Impaired glucose tolerance 2012     TB lung, latent 2011    INH treatment; CXR neg     Past Surgical History   Past Surgical History:   Procedure Laterality Date     C REGULAR ECHO (FL)  2013    normal (murmur benign)     CHOLECYSTECTOMY, LAPOROSCOPIC  2005     DILATION AND CURETTAGE SUCTION  2010     DILATION AND CURETTAGE SUCTION  1/31/2013    Procedure: DILATION AND CURETTAGE SUCTION;  Suction D&C, mac anesthesia, para cervical block;  Surgeon: Saran Mccullough MD;  Location: [facility] OR     ENDOSCOPY  2010    neg     HC INSERTION INTRAUTERINE DEVICE  2008    Mirena     HC REMOVE INTRAUTERINE DEVICE  2008     Current Medications  Prescription Medications as of 7/20/2018             acetone, Urine, test STRP 1 strip by In Vitro route daily    blood glucose monitoring ([employer] MICROLET) lancets Use to test blood sugar 4 times daily or as directed.  Ok to substitute alternative if insurance prefers.    blood glucose monitoring (CONTOUR NEXT TEST) test strip Use to test blood sugar 4 times daily or as directed.    blood glucose monitoring (ONE TOUCH ULTRA 2) meter device kit Use to test blood sugar 4 times daily or as directed.    Cholecalciferol (VITAMIN D) 2000 UNITS tablet 2 tabs daily for one month, then 1 tab daily    Prenatal Vit-Fe Fumarate-FA (PRENATAL MULTIVITAMIN PLUS IRON) 27-0.8 MG TABS per tablet Take 1 tablet by mouth daily    cyclobenzaprine (FLEXERIL) 10 MG tablet Take 0.5 tablets (5 mg) by mouth 2 times daily as needed for muscle spasms          Family History   Problem Relation Age of Onset     Hypertension Mother      Diabetes Brother      Cerebrovascular Disease Brother      Alzheimer Disease Father 78     Alzheimer Disease Maternal Grandmother      unknown   Brother diagnosed with type 2 diabetes. Father HTN. Aunt - cervical cancer.     Social History   with 4 children. She denies smoking, drinking alcohol or  using illicit drugs. Occupation: unemployed.     Review of Systems   Systemic:             No sign fatigue   Eye:                      No eye symptoms   Donny-Laryngeal:     No donny-laryngeal symptoms, no dysphagia, no hoarseness, no cough     Breast:                  No breast symptoms  Cardiovascular:    No cardiovascular symptoms, no CP or palpitations   Pulmonary:           No pulmonary symptoms, no SOB or cough    Gastrointestinal:   No gastrointestinal symptoms, no diarrhea or constipation   Genitourinary:       Some increased thirst and urination   Endocrine:            No endocrine symptoms, no cold or heat intolerance   Neurological:        occasional headaches, no tremor, no numbness or tingling sensation, no dizziness   Musculoskeletal:  No musculoskeletal symptoms, no muscle or joint pain   Skin:                     No skin symptoms, no dry skin, no hair falling out   Psychological:     No psychological symptoms                 Vital Signs     Previous Weights:    Wt Readings from Last 10 Encounters:   07/20/18 70.9 kg (156 lb 4.9 oz)   07/19/18 70.8 kg (156 lb)   06/29/18 70.8 kg (156 lb)   06/22/18 69.9 kg (154 lb 3.2 oz)   05/16/18 69.4 kg (153 lb)   04/23/18 71.6 kg (157 lb 12.8 oz)   01/22/15 68.5 kg (151 lb)   12/11/14 67.8 kg (149 lb 8 oz)   11/19/14 68.3 kg (150 lb 8 oz)   10/02/14 69.1 kg (152 lb 6.4 oz)        /67  Pulse 85  Ht 1.524 m (5')  Wt 70.9 kg (156 lb 4.9 oz)  LMP 03/03/2018 (Exact Date)  BMI 30.53 kg/m2    Physical Exam  General Appearance: she is well developed, well nourished and in no distress     Eyes:  conjutivae and extra-ocular motions are normal.                                    pupils round and reactive to light, no lid lag, no stare    HEENT:   oropharynx clear and moist, no JVD, no bruits     Thyroid mildly prominent, normal for pregnancy, no palpable nodules   Cardiovascular:  regular rhythm, no murmurs, distal pulse palpable, no edema  Respiratory:        chest  clear, no rales, no rhonchi   Gastrointestinal:  abdomen pregnant, benign looking abdominal striae  Musculoskeletal:  normal tone and strength  Psychological:          affect and judgment normal  Skin:  warm, no lesions  Neurological:  reflexes normal and symmetric, no resting tremor.     Lab Results  I reviewed prior lab results documented in Epic.  TSH   Date Value Ref Range Status   12/08/2014 2.25 0.40 - 4.00 mU/L Final     Comment:     Effective 7/30/2014, the reference range for this assay has changed to reflect   new instrumentation/methodology.     08/03/2012 1.19 0.4 - 5.0 mU/L Final   10/06/2011 1.62 0.4 - 5.0 mU/L Final   06/10/2010 0.81 0.4 - 5.0 mU/L Final     Lab Results   Component Value Date    A1C 5.7 06/29/2018    A1C 5.9 12/08/2014    A1C 5.5 12/14/2012    A1C 5.7 07/26/2012    A1C 5.5 02/29/2012       Assessment     Diabetes diagnosed in the second trimester, in a 38-year-old female with a history of gestational diabetes and possible prediabetes prior to this pregnancy.  She has been experiencing persistent fasting hyperglycemia. Most of the postprandial blood glucose numbers are at target.    Pregnancy BG targets were discussed with the patient:  Fasting blood glucose concentration ?95 mg/dL  One-hour postprandial blood glucose concentration ?140 mg/dL  Two-hour postprandial glucose concentration ?120 mg/dL  A1C ?6 percent    Counseled on the importance of obtaining a good glycemic control during pregnancy in order to decrease the risk of complications like macrosomia, preeclampsia, premature delivery etc.    Recommendations:  Start 7 units NPH at bedtime  Check BG before breakfast, 1 hrs after meals and before dinner.  For larger snacks, also check the 1 hour postprandial blood glucose.  Have the numbers sent to us in 1 week.     No orders of the defined types were placed in this encounter.            Again, thank you for allowing me to participate in the care of your patient.       Sincerely,    Stefani Hogan MD

## 2018-07-20 NOTE — PATIENT INSTRUCTIONS
Blood glucose goals in diabetic pregnant woman: premeal BG < 95, 1 hr postprandial < 140, HbA1c <6%.  Administer 7 U insulin at bedtime   Check BG in the morning, 1 hr after meals and before dinner. For larger snacks - also check BG 1 hr after the snack   Send BG numbers in 1 week

## 2018-07-24 LAB
COPATH REPORT: NORMAL
PAP: NORMAL

## 2018-07-25 LAB
FINAL DIAGNOSIS: NORMAL
HPV HR 12 DNA CVX QL NAA+PROBE: NEGATIVE
HPV16 DNA SPEC QL NAA+PROBE: NEGATIVE
HPV18 DNA SPEC QL NAA+PROBE: NEGATIVE
SPECIMEN DESCRIPTION: NORMAL
SPECIMEN SOURCE CVX/VAG CYTO: NORMAL

## 2018-08-06 NOTE — PROGRESS NOTES
Called patient with the help of a  and left message.  I reviewed the glucometer readings which were downloaded in the clinic. I recommended to increase the dose of evening NPH from 7 units to 12 units, as her morning blood sugar numbers have been elevated, frequently in the 120-130 range.  She has a follow-up appointment scheduled in 1 week.  The 1 hour postprandial glucose remains at target, approximately 80% of the time.

## 2018-08-08 ENCOUNTER — TELEPHONE (OUTPATIENT)
Dept: EDUCATION SERVICES | Facility: CLINIC | Age: 38
End: 2018-08-08

## 2018-08-08 DIAGNOSIS — O24.419 GESTATIONAL DIABETES MELLITUS: Primary | ICD-10-CM

## 2018-08-08 DIAGNOSIS — O24.419 GESTATIONAL DIABETES: Primary | ICD-10-CM

## 2018-08-08 RX ORDER — LANCETS 33 GAUGE
360 EACH MISCELLANEOUS 4 TIMES DAILY
Qty: 360 EACH | Refills: 1 | Status: SHIPPED | OUTPATIENT
Start: 2018-08-08

## 2018-08-08 NOTE — TELEPHONE ENCOUNTER
Received email from [facility]:    Monserrat Ochoa  MRN:   [id number]  Female, 38 year old, 1980    Pt s  Phillip calling (Consent on file) he states Monserrat is having high readings in the morning and they have a few questions about this and the humulin kwik pen. Pt has seen Milady Blakely twice but she is out of the office until 8/15. Please call Phillip at [phone]- pt is Kiswahili speaking ok to speak with  who is English speaking.    Cheri SCHMITT:    Called Phillip back, left voice message to call triage line () with last few days of readings before deciding on any medication adjustments for his wife.     Will have triage attempt to call again 8/9 if no reply today.    ALMAS Hawk CDE

## 2018-08-08 NOTE — TELEPHONE ENCOUNTER
Left message at phone number below for Phillip to call me back regarding his wife's blood sugar. Germaine Sandoval RN,CDE

## 2018-08-13 ENCOUNTER — TELEPHONE (OUTPATIENT)
Dept: FAMILY MEDICINE | Facility: CLINIC | Age: 38
End: 2018-08-13

## 2018-08-13 ENCOUNTER — OFFICE VISIT (OUTPATIENT)
Dept: ENDOCRINOLOGY | Facility: CLINIC | Age: 38
End: 2018-08-13
Payer: COMMERCIAL

## 2018-08-13 VITALS
HEART RATE: 84 BPM | WEIGHT: 155.9 LBS | DIASTOLIC BLOOD PRESSURE: 66 MMHG | SYSTOLIC BLOOD PRESSURE: 102 MMHG | BODY MASS INDEX: 30.61 KG/M2 | HEIGHT: 60 IN

## 2018-08-13 DIAGNOSIS — R53.83 FATIGUE, UNSPECIFIED TYPE: ICD-10-CM

## 2018-08-13 DIAGNOSIS — L65.9 HAIR LOSS: ICD-10-CM

## 2018-08-13 DIAGNOSIS — O24.414 INSULIN CONTROLLED GESTATIONAL DIABETES MELLITUS (GDM) DURING PREGNANCY, ANTEPARTUM: Primary | ICD-10-CM

## 2018-08-13 LAB — TSH SERPL DL<=0.005 MIU/L-ACNC: 1.52 MU/L (ref 0.4–4)

## 2018-08-13 RX ORDER — INSULIN DETEMIR 100 [IU]/ML
20 INJECTION, SOLUTION SUBCUTANEOUS AT BEDTIME
Qty: 18 ML | Refills: 3 | Status: SHIPPED | OUTPATIENT
Start: 2018-08-13 | End: 2018-10-15

## 2018-08-13 NOTE — MR AVS SNAPSHOT
After Visit Summary   8/13/2018    Monserrat White    MRN: [id number]           Patient Information     Date Of Birth          1980        Visit Information        Provider Department      8/13/2018 10:45 AM Reyes, Rebecca; Stefani Hogan MD [facility]        Today's Diagnoses     Insulin controlled gestational diabetes mellitus (GDM) during pregnancy, antepartum    -  1    Fatigue, unspecified type        Hair loss          Care Instructions    Increase the dose of insulin to 16 U   Check BG before breakfast, 1 hr after breakfast, 1 hr after lunch, before dinner, 1 hr after dinner and at bedtime   If the morning BG is above 95 after taking 16 U insulin for 4 days, further increase the dose of insulin to 20 U.             Follow-ups after your visit        Follow-up notes from your care team     Return in about 1 month (around 9/13/2018) for labs today.      Your next 10 appointments already scheduled     Aug 13, 2018 12:15 PM CDT   LAB with [facility] ([facility])    [address] [place] [id number] [phone]           Please do not eat 10-12 hours before your appointment if you are coming in fasting for labs on lipids, cholesterol, or glucose (sugar). This does not apply to pregnant women. Water, hot tea and black coffee (with nothing added) are okay. Do not drink other fluids, diet soda or chew gum.            Aug 22, 2018  8:45 AM CDT   ESTABLISHED PRENATAL with Saran Mccullough MD   [facility] ([facility])    [address] [place] [id number] [phone]            Sep 17, 2018 10:30 AM CDT   (Arrive by 10:15 AM)   RETURN DIABETES with Stefani Hogan MD   [facility] Endocrinology ([facility])    [address] [place] [id number] [phone]              Future tests that  were ordered for you today     Open Future Orders        Priority Expected Expires Ordered    TSH with free T4 reflex Routine 8/13/2018 8/13/2019 8/13/2018            Who to contact     Please call your clinic at [phone] to:    Ask questions about your health    Make or cancel appointments    Discuss your medicines    Learn about your test results    Speak to your doctor            Additional Information About Your Visit        Care EveryWhere ID     This is your Care EveryWhere ID. This could be used by other organizations to access your [facility] medical records  [id number]        Your Vitals Were     Pulse Height Last Period BMI (Body Mass Index)          84 1.524 m (5') 03/03/2018 (Exact Date) 30.45 kg/m2         Blood Pressure from Last 3 Encounters:   08/13/18 102/66   07/20/18 105/67   07/19/18 104/68    Weight from Last 3 Encounters:   08/13/18 70.7 kg (155 lb 14.4 oz)   07/20/18 70.9 kg (156 lb 4.9 oz)   07/19/18 70.8 kg (156 lb)                 Today's Medication Changes          These changes are accurate as of 8/13/18 12:03 PM.  If you have any questions, ask your nurse or doctor.               Start taking these medicines.        Dose/Directions    LEVEMIR FLEXPEN/FLEXTOUCH 100 UNIT/ML injection   Used for:  Insulin controlled gestational diabetes mellitus (GDM) during pregnancy, antepartum   Generic drug:  insulin detemir   Started by:  Stefani Hogan MD        Dose:  20 Units   Inject 20 Units Subcutaneous At Bedtime   Quantity:  18 mL   Refills:  3            Where to get your medicines      These medications were sent to [facility]/pharmacy #[id number] - [place], [place] - [address]  [address] [facility] [address]     Phone:  [phone]     LEVEMIR FLEXPEN/FLEXTOUCH 100 UNIT/ML injection                Primary Care Provider Office Phone # Fax #    Brandan Miller [phone] [address] [facility] [address]        Equal  Access to Services     [facility] : Hadii aad ku hadselvin Burton, waaxda luqadaha, qaybta kaalmayifan mayfield. So [facility] [phone].    ATENCIÓN: Si habla español, tiene a fernandez disposición servicios gratuitos de asistencia lingüística. Llame al [phone].    We comply with applicable federal civil rights laws and [place] laws. We do not discriminate on the basis of race, color, national origin, age, disability, sex, sexual orientation, or gender identity.            Thank you!     Thank you for choosing [facility] ENDOCRINOLOGY  for your care. Our goal is always to provide you with excellent care. Hearing back from our patients is one way we can continue to improve our services. Please take a few minutes to complete the written survey that you may receive in the mail after your visit with us. Thank you!             Your Updated Medication List - Protect others around you: Learn how to safely use, store and throw away your medicines at www.disposemymeds.org.          This list is accurate as of 8/13/18 12:03 PM.  Always use your most recent med list.                   Brand Name Dispense Instructions for use Diagnosis    acetone (Urine) test Strp     20 each    1 strip by In Vitro route daily    Gestational diabetes       * blood glucose monitoring lancets     200 each    Use to test blood sugar 4 times daily or as directed.  Ok to substitute alternative if insurance prefers.    Gestational diabetes       * ONETOUCH DELICA LANCETS 33G Misc     360 each    360 each 4 times daily    Gestational diabetes       blood glucose monitoring meter device kit     1 kit    Use to test blood sugar 4 times daily or as directed.    Gestational diabetes mellitus (GDM), antepartum, gestational diabetes method of control unspecified       * blood glucose monitoring test strip    CONTOUR NEXT TEST    200 each    Use to test blood sugar 4 times daily or as directed.    Gestational diabetes        * blood glucose monitoring test strip    no brand specified    360 each    Use to test blood sugar 4  times daily or as directed.    Gestational diabetes       insulin isophane human 100 UNIT/ML injection    HumuLIN N KWIKPEN    12 mL    Inject 12 Units Subcutaneous At Bedtime    Gestational diabetes mellitus (GDM) requiring insulin       insulin pen needle 32G X 4 MM    BD BHANU U/F    100 each    Use 1 pen needles daily or as directed.        LEVEMIR FLEXPEN/FLEXTOUCH 100 UNIT/ML injection   Generic drug:  insulin detemir     18 mL    Inject 20 Units Subcutaneous At Bedtime    Insulin controlled gestational diabetes mellitus (GDM) during pregnancy, antepartum       prenatal multivitamin plus iron 27-0.8 MG Tabs per tablet      Take 1 tablet by mouth daily        * Notice:  This list has 4 medication(s) that are the same as other medications prescribed for you. Read the directions carefully, and ask your doctor or other care provider to review them with you.

## 2018-08-13 NOTE — TELEPHONE ENCOUNTER
Authorization to Discuss Protected Health Information with  (Phillip) signed on 05-09-16 and on file.   Return call to . A female answered the phone and stated no Phillip at that number. Could hear a man speaking in the background. Female verified phone number. Waited on the line for 4 mins and the female did not give the phone to the man.   Noted patient has an 11:00 appt today with endocrine. Appt notes indicate that patient is arrived for the appt. Milady Mark RN, BAN

## 2018-08-13 NOTE — PATIENT INSTRUCTIONS
Increase the dose of insulin to 16 U   Check BG before breakfast, 1 hr after breakfast, 1 hr after lunch, before dinner, 1 hr after dinner and at bedtime   If the morning BG is above 95 after taking 16 U insulin for 4 days, further increase the dose of insulin to 20 U.

## 2018-08-13 NOTE — NURSING NOTE
Chief Complaint   Patient presents with     RECHECK     GDM     Performed capillary puncture for A1C testing. Patient tolerated well.    Josh Aguirre [facility]  Endocrinology & Diabetes

## 2018-08-13 NOTE — LETTER
8/13/2018       RE: Monserrat White  [address]  [facility] [address]     Dear Colleague,    Thank you for referring your patient, Monserrat White, to the [facility] [place] [facility]OLOG[facility] at [facility]. Please see a copy of my visit note below.      The patient is seen in consultation at the request of Dr Mccullough for evaluation of diabetes in pregnancy. She is seen with the help of a .     Monserrat White is a 38 year old female diagnosed with diabetes during the 12th week of pregnancy.  She is now 22 weeks pregnant.  The estimated delivery date is 12/16/18.  This is her fifth pregnancy.  With her first pregnancy, 16 years ago, she was diagnosed with diabetes (tx with insulin) and she lost the baby at 4 months.  She also had a miscarriage at 12 weeks.  Last pregnancy was 5 years ago and she did not develop gestational diabetes at that time.  Following last delivery, the patient remembers having the blood glucose checked for approximately 6 months and it was normal.    A1c checked in 2014 was 5.9, suggesting prediabetes.  On 6/29/18, A1c was 5.7.  It was 5.5% today.    At her last visit here, in July, she was started on treatment with NPH, 7 units at bedtime.  2 weeks ago, I recommended to increase the dose to 12 units but the patient reports getting this message only last Wednesday.    The glucometer readings revealed that she checks her blood glucose 3-4 times daily, either before or 1 hour following meals.  Average blood glucose is 123 with a standard deviation of 20 and a range variable between 81 and 201.  There are no hypoglycemic episodes documented by the meter.  The patient reports feeling symptomatic with shakiness and increased sweating when her blood sugar dropped at 81, after doing some shopping, in the afternoon.  The morning blood sugar is consistently elevated, with an average around 130.  Despite increasing the  insulin dose, the morning blood sugar has remained persistently elevated.    The patient is concerned she might have an allergic reaction to insulin.  Since starting the insulin, her bones and muscles hurt more and she feels that she has more leg cramps.  She admits that she has a long-standing history of muscle pain, present intermittently, for over 3 years but it has been more prominent recently.  After injecting the bedtime dose of insulin, her heart is beating faster and he gets anxious and cannot sleep at night.  Sometimes, she also noticed some shortness of breath at night, present together with the palpitations, for 2-3 hours.  Denies skin rashes.  Intermittently, her skin has been itchy.  For the last 2 months, she has noticed increased hair loss and dry her skin.  She has been less tolerant with extreme temperatures.  Denies nausea, constipation, diarrhea.     Past Medical History   Past Medical History:   Diagnosis Date     Arachnoid cyst 2012     Impaired glucose tolerance 2012     TB lung, latent 2011    INH treatment; CXR neg     Past Surgical History   Past Surgical History:   Procedure Laterality Date     C REGULAR ECHO (FL)  2013    normal (murmur benign)     CHOLECYSTECTOMY, LAPOROSCOPIC  2005     DILATION AND CURETTAGE SUCTION  2010     DILATION AND CURETTAGE SUCTION  1/31/2013    Procedure: DILATION AND CURETTAGE SUCTION;  Suction D&C, mac anesthesia, para cervical block;  Surgeon: Saran Mccullough MD;  Location: [facility] OR     ENDOSCOPY  2010    neg     HC INSERTION INTRAUTERINE DEVICE  2008    Mirena     HC REMOVE INTRAUTERINE DEVICE  2008     Current Medications  Prescription Medications as of 8/13/2018             acetone, Urine, test STRP 1 strip by In Vitro route daily    blood glucose monitoring (JOE MICROLET) lancets Use to test blood sugar 4 times daily or as directed.  Ok to substitute alternative if insurance prefers.    blood glucose monitoring (CONTOUR NEXT TEST) test strip Use to  test blood sugar 4 times daily or as directed.    blood glucose monitoring (NO BRAND SPECIFIED) test strip Use to test blood sugar 4  times daily or as directed.    blood glucose monitoring (ONE TOUCH ULTRA 2) meter device kit Use to test blood sugar 4 times daily or as directed.    Cholecalciferol (VITAMIN D) 2000 UNITS tablet 2 tabs daily for one month, then 1 tab daily    cyclobenzaprine (FLEXERIL) 10 MG tablet Take 0.5 tablets (5 mg) by mouth 2 times daily as needed for muscle spasms    insulin isophane human (HUMULIN N KWIKPEN) 100 UNIT/ML injection Inject 12 Units Subcutaneous At Bedtime    insulin pen needle (BD BHANU U/F) 32G X 4 MM Use 1 pen needles daily or as directed.    ONETOUCH DELICA LANCETS 33G MISC 360 each 4 times daily    Prenatal Vit-Fe Fumarate-FA (PRENATAL MULTIVITAMIN PLUS IRON) 27-0.8 MG TABS per tablet Take 1 tablet by mouth daily          Family History   Problem Relation Age of Onset     Hypertension Mother      Diabetes Brother      Cerebrovascular Disease Brother      Alzheimer Disease Father 78     Alzheimer Disease Maternal Grandmother      unknown   Brother diagnosed with type 2 diabetes. Father HTN. Aunt - cervical cancer.     Social History   with 4 children. She denies smoking, drinking alcohol or using illicit drugs. Occupation: unemployed.     Review of Systems   10 point review of systems negative unless specified above.              Vital Signs     Previous Weights:    Wt Readings from Last 10 Encounters:   08/13/18 70.7 kg (155 lb 14.4 oz)   07/20/18 70.9 kg (156 lb 4.9 oz)   07/19/18 70.8 kg (156 lb)   06/29/18 70.8 kg (156 lb)   06/22/18 69.9 kg (154 lb 3.2 oz)   05/16/18 69.4 kg (153 lb)   04/23/18 71.6 kg (157 lb 12.8 oz)   01/22/15 68.5 kg (151 lb)   12/11/14 67.8 kg (149 lb 8 oz)   11/19/14 68.3 kg (150 lb 8 oz)        /66 (BP Location: Right arm, Patient Position: Sitting, Cuff Size: Adult Regular)  Pulse 84  Ht 1.524 m (5')  Wt 70.7 kg (155 lb 14.4 oz)   LMP 03/03/2018 (Exact Date)  BMI 30.45 kg/m2    Physical Exam  General Appearance: she is well developed, well nourished and in no distress     Eyes:  conjutivae and extra-ocular motions are normal.                                    pupils round and reactive to light, no lid lag, no stare    HEENT:   oropharynx clear and moist, no JVD, no bruits     Thyroid mildly prominent, normal for pregnancy, no palpable nodules   Cardiovascular:  regular rhythm, no murmurs, distal pulse palpable, no edema  Respiratory:        chest clear, no rales, no rhonchi   Gastrointestinal:  abdomen pregnant, benign looking abdominal striae  Musculoskeletal:  normal tone and strength  Psychological:          affect and judgment normal  Skin: Acanthosis nigricans of the flexural areas  Neurological:  reflexes normal and symmetric, no resting tremor.     Lab Results  I reviewed prior lab results documented in Epic.  TSH   Date Value Ref Range Status   12/08/2014 2.25 0.40 - 4.00 mU/L Final     Comment:     Effective 7/30/2014, the reference range for this assay has changed to reflect   new instrumentation/methodology.     08/03/2012 1.19 0.4 - 5.0 mU/L Final   10/06/2011 1.62 0.4 - 5.0 mU/L Final   06/10/2010 0.81 0.4 - 5.0 mU/L Final     Lab Results   Component Value Date    A1C 5.7 06/29/2018    A1C 5.9 12/08/2014    A1C 5.5 12/14/2012    A1C 5.7 07/26/2012    A1C 5.5 02/29/2012       Assessment     Diabetes, diagnosed in the second trimester, in a 38-year-old female with a history of gestational diabetes and possible prediabetes prior to this pregnancy.  She continues to experience persistent fasting hyperglycemia. Most of the postprandial blood glucose numbers are at target.  I reassured the patient that is very unlikely for her to have an allergic reaction from insulin.  However, since she would prefer to try something different and see if she still has the symptoms, I recommended Levemir, 12 units daily.  Of noted that she denies  using an insulin vial of NovoLog and, which is covered by her insurance.      Recommendations:  Increase the dose of insulin to 16 U.  I placed a prescription for Levemir instead of Humulin N.  Check BG before breakfast, 1 hr after breakfast, 1 hr after lunch, before dinner, 1 hr after dinner and at bedtime   If the morning BG is above 95 after taking 16 U insulin for 4 days, further increase the dose of insulin to 20 U.   Have the numbers sent to us in 1-2 weeks.  For times of increased physical activity/walking, she was instructed to carry with her small snacks, in the event she develops symptoms of hypoglycemia.    Hair loss, dry skin, fatigue  Check reflex TSH today    Diffuse musculoskeletal pain, reproducible on palpation  I reassured the patient it is not due to insulin.     Orders Placed This Encounter   Procedures     TSH with free T4 reflex       Again, thank you for allowing me to participate in the care of your patient.      Sincerely,    Stefani Hogan MD

## 2018-08-13 NOTE — PROGRESS NOTES
The patient is seen in consultation at the request of Dr Mccullough for evaluation of diabetes in pregnancy. She is seen with the help of a .     Monserrat White is a 38 year old female diagnosed with diabetes during the 12th week of pregnancy.  She is now 22 weeks pregnant.  The estimated delivery date is 12/16/18.  This is her fifth pregnancy.  With her first pregnancy, 16 years ago, she was diagnosed with diabetes (tx with insulin) and she lost the baby at 4 months.  She also had a miscarriage at 12 weeks.  Last pregnancy was 5 years ago and she did not develop gestational diabetes at that time.  Following last delivery, the patient remembers having the blood glucose checked for approximately 6 months and it was normal.    A1c checked in 2014 was 5.9, suggesting prediabetes.  On 6/29/18, A1c was 5.7.  It was 5.5% today.    At her last visit here, in July, she was started on treatment with NPH, 7 units at bedtime.  2 weeks ago, I recommended to increase the dose to 12 units but the patient reports getting this message only last Wednesday.    The glucometer readings revealed that she checks her blood glucose 3-4 times daily, either before or 1 hour following meals.  Average blood glucose is 123 with a standard deviation of 20 and a range variable between 81 and 201.  There are no hypoglycemic episodes documented by the meter.  The patient reports feeling symptomatic with shakiness and increased sweating when her blood sugar dropped at 81, after doing some shopping, in the afternoon.  The morning blood sugar is consistently elevated, with an average around 130.  Despite increasing the insulin dose, the morning blood sugar has remained persistently elevated.    The patient is concerned she might have an allergic reaction to insulin.  Since starting the insulin, her bones and muscles hurt more and she feels that she has more leg cramps.  She admits that she has a long-standing history of muscle  pain, present intermittently, for over 3 years but it has been more prominent recently.  After injecting the bedtime dose of insulin, her heart is beating faster and he gets anxious and cannot sleep at night.  Sometimes, she also noticed some shortness of breath at night, present together with the palpitations, for 2-3 hours.  Denies skin rashes.  Intermittently, her skin has been itchy.  For the last 2 months, she has noticed increased hair loss and dry her skin.  She has been less tolerant with extreme temperatures.  Denies nausea, constipation, diarrhea.     Past Medical History   Past Medical History:   Diagnosis Date     Arachnoid cyst 2012     Impaired glucose tolerance 2012     TB lung, latent 2011    INH treatment; CXR neg     Past Surgical History   Past Surgical History:   Procedure Laterality Date     C REGULAR ECHO ([place])  2013    normal (murmur benign)     CHOLECYSTECTOMY, LAPOROSCOPIC  2005     DILATION AND CURETTAGE SUCTION  2010     DILATION AND CURETTAGE SUCTION  1/31/2013    Procedure: DILATION AND CURETTAGE SUCTION;  Suction D&C, mac anesthesia, para cervical block;  Surgeon: Saran Mccullough MD;  Location: [facility] OR     ENDOSCOPY  2010    neg     HC INSERTION INTRAUTERINE DEVICE  2008    Mirena     HC REMOVE INTRAUTERINE DEVICE  2008     Current Medications  Prescription Medications as of 8/13/2018             acetone, Urine, test STRP 1 strip by In Vitro route daily    blood glucose monitoring (JOE MICROLET) lancets Use to test blood sugar 4 times daily or as directed.  Ok to substitute alternative if insurance prefers.    blood glucose monitoring (CONTOUR NEXT TEST) test strip Use to test blood sugar 4 times daily or as directed.    blood glucose monitoring (NO BRAND SPECIFIED) test strip Use to test blood sugar 4  times daily or as directed.    blood glucose monitoring (ONE TOUCH ULTRA 2) meter device kit Use to test blood sugar 4 times daily or as directed.    Cholecalciferol (VITAMIN D) 2000  UNITS tablet 2 tabs daily for one month, then 1 tab daily    cyclobenzaprine (FLEXERIL) 10 MG tablet Take 0.5 tablets (5 mg) by mouth 2 times daily as needed for muscle spasms    insulin isophane human (HUMULIN N KWIKPEN) 100 UNIT/ML injection Inject 12 Units Subcutaneous At Bedtime    insulin pen needle (BD BHANU U/F) 32G X 4 MM Use 1 pen needles daily or as directed.    ONETOUCH DELICA LANCETS 33G MISC 360 each 4 times daily    Prenatal Vit-Fe Fumarate-FA (PRENATAL MULTIVITAMIN PLUS IRON) 27-0.8 MG TABS per tablet Take 1 tablet by mouth daily          Family History   Problem Relation Age of Onset     Hypertension Mother      Diabetes Brother      Cerebrovascular Disease Brother      Alzheimer Disease Father 78     Alzheimer Disease Maternal Grandmother      unknown   Brother diagnosed with type 2 diabetes. Father HTN. Aunt - cervical cancer.     Social History   with 4 children. She denies smoking, drinking alcohol or using illicit drugs. Occupation: unemployed.     Review of Systems   10 point review of systems negative unless specified above.              Vital Signs     Previous Weights:    Wt Readings from Last 10 Encounters:   08/13/18 70.7 kg (155 lb 14.4 oz)   07/20/18 70.9 kg (156 lb 4.9 oz)   07/19/18 70.8 kg (156 lb)   06/29/18 70.8 kg (156 lb)   06/22/18 69.9 kg (154 lb 3.2 oz)   05/16/18 69.4 kg (153 lb)   04/23/18 71.6 kg (157 lb 12.8 oz)   01/22/15 68.5 kg (151 lb)   12/11/14 67.8 kg (149 lb 8 oz)   11/19/14 68.3 kg (150 lb 8 oz)        /66 (BP Location: Right arm, Patient Position: Sitting, Cuff Size: Adult Regular)  Pulse 84  Ht 1.524 m (5')  Wt 70.7 kg (155 lb 14.4 oz)  LMP 03/03/2018 (Exact Date)  BMI 30.45 kg/m2    Physical Exam  General Appearance: she is well developed, well nourished and in no distress     Eyes:  conjutivae and extra-ocular motions are normal.                                    pupils round and reactive to light, no lid lag, no stare    HEENT:   oropharynx  clear and moist, no JVD, no bruits     Thyroid mildly prominent, normal for pregnancy, no palpable nodules   Cardiovascular:  regular rhythm, no murmurs, distal pulse palpable, no edema  Respiratory:        chest clear, no rales, no rhonchi   Gastrointestinal:  abdomen pregnant, benign looking abdominal striae  Musculoskeletal:  normal tone and strength  Psychological:          affect and judgment normal  Skin: Acanthosis nigricans of the flexural areas  Neurological:  reflexes normal and symmetric, no resting tremor.     Lab Results  I reviewed prior lab results documented in Epic.  TSH   Date Value Ref Range Status   12/08/2014 2.25 0.40 - 4.00 mU/L Final     Comment:     Effective 7/30/2014, the reference range for this assay has changed to reflect   new instrumentation/methodology.     08/03/2012 1.19 0.4 - 5.0 mU/L Final   10/06/2011 1.62 0.4 - 5.0 mU/L Final   06/10/2010 0.81 0.4 - 5.0 mU/L Final     Lab Results   Component Value Date    A1C 5.7 06/29/2018    A1C 5.9 12/08/2014    A1C 5.5 12/14/2012    A1C 5.7 07/26/2012    A1C 5.5 02/29/2012       Assessment     Diabetes, diagnosed in the second trimester, in a 38-year-old female with a history of gestational diabetes and possible prediabetes prior to this pregnancy.  She continues to experience persistent fasting hyperglycemia. Most of the postprandial blood glucose numbers are at target.  I reassured the patient that is very unlikely for her to have an allergic reaction from insulin.  However, since she would prefer to try something different and see if she still has the symptoms, I recommended Levemir, 12 units daily.  Of noted that she denies using an insulin vial of NovoLog and, which is covered by her insurance.      Recommendations:  Increase the dose of insulin to 16 U.  I placed a prescription for Levemir instead of Humulin N.  Check BG before breakfast, 1 hr after breakfast, 1 hr after lunch, before dinner, 1 hr after dinner and at bedtime   If the  morning BG is above 95 after taking 16 U insulin for 4 days, further increase the dose of insulin to 20 U.   Have the numbers sent to us in 1-2 weeks.  For times of increased physical activity/walking, she was instructed to carry with her small snacks, in the event she develops symptoms of hypoglycemia.    Hair loss, dry skin, fatigue  Check reflex TSH today    Diffuse musculoskeletal pain, reproducible on palpation  I reassured the patient it is not due to insulin.     Orders Placed This Encounter   Procedures     TSH with free T4 reflex

## 2018-08-13 NOTE — TELEPHONE ENCOUNTER
...Reason for Call:  Other     Detailed comments: Patients  called he said the patient is currently on insulin, he said she is having issues with insulin.    Phone Number Patient can be reached at: Home number on file [phone] (home)    Best Time: anytime    Can we leave a detailed message on this number? YES    Call taken on 8/13/2018 at 9:11 AM by Ceci Ibrahim

## 2018-08-22 ENCOUNTER — PRENATAL OFFICE VISIT (OUTPATIENT)
Dept: OBGYN | Facility: CLINIC | Age: 38
End: 2018-08-22
Payer: COMMERCIAL

## 2018-08-22 VITALS
DIASTOLIC BLOOD PRESSURE: 70 MMHG | HEART RATE: 89 BPM | BODY MASS INDEX: 30.27 KG/M2 | WEIGHT: 155 LBS | TEMPERATURE: 98.1 F | SYSTOLIC BLOOD PRESSURE: 107 MMHG

## 2018-08-22 DIAGNOSIS — O24.414 INSULIN CONTROLLED GESTATIONAL DIABETES MELLITUS (GDM) IN SECOND TRIMESTER: ICD-10-CM

## 2018-08-22 DIAGNOSIS — O24.414 INSULIN CONTROLLED GESTATIONAL DIABETES MELLITUS (GDM) DURING PREGNANCY, ANTEPARTUM: ICD-10-CM

## 2018-08-22 DIAGNOSIS — H92.01 RIGHT EAR PAIN: Primary | ICD-10-CM

## 2018-08-22 DIAGNOSIS — Z34.80 PRENATAL CARE, SUBSEQUENT PREGNANCY, UNSPECIFIED TRIMESTER: ICD-10-CM

## 2018-08-22 PROBLEM — O23.42: Status: RESOLVED | Noted: 2018-06-30 | Resolved: 2018-08-22

## 2018-08-22 PROBLEM — O20.8 SUBCHORIONIC HEMORRHAGE IN FIRST TRIMESTER: Status: RESOLVED | Noted: 2018-04-27 | Resolved: 2018-08-22

## 2018-08-22 PROCEDURE — 99207 ZZC PRENATAL VISIT: CPT | Performed by: OBSTETRICS & GYNECOLOGY

## 2018-08-22 NOTE — MR AVS SNAPSHOT
After Visit Summary   8/22/2018    Monserrat White    MRN: [id number]           Patient Information     Date Of Birth          1980        Visit Information        Provider Department      8/22/2018 8:30 AM Saran Mccullough MD; [employer] [facility]        Today's Diagnoses     Infection of urinary tract in pregnancy in second trimester        Gestational diabetes mellitus (GDM), antepartum        Prenatal care, subsequent pregnancy, unspecified trimester          Care Instructions                                                        If you have any questions regarding your visit, Please contact your care team.     [employer]: [phone]    [facility] CLINIC HOURS TELEPHONE NUMBER       Saran Mccullough M.D.      Beckie-      Prakash Mason-Medical Assistant       Monday-[place]  8:00a.m-4:45 p.m  Tuesday-[place]  9:00a.m-11:45 a.m  Wednesday-[place] 8:00a.m-4:45 p.m.  Thursday-[place]  8:00a.m-4:45 p.m.  Friday-[place]  8:00a.m-4:45 p.m. [facility]  [address]  [place] [address] [phone] [facility]  [phone] Fax  Imaging [phone]    [facility] Labor [facility] Delivery  [address] [place] [address] Dr.  [place], [place] [address] [phone]    [facility]  [address]  [place] [address] [phone] [facility]  [phone] Fax  Imaging [phone]     Urgent Care locations:    [facility] Monday-Friday  5 pm - 9 pm  Saturday and Sunday   9 am - 5 pm    Monday-Friday   11 am - 9 pm  Saturday and Sunday   9 am - 5 pm   [phone]       If you need a medication refill, please contact your pharmacy. Please allow 3 business days for your refill to be completed.  As always, Thank you for trusting us with your healthcare needs!            Follow-ups  after your visit        Your next 10 appointments already scheduled     Sep 17, 2018 10:30 AM CDT   (Arrive by 10:15 AM)   RETURN DIABETES with Stefani Hogan MD   [facility] End[facility]ology ([facility] [place])    [address] [facility]  3rd [facility] [address] [phone]              Who to contact     If you have questions or need follow up information about today's clinic visit or your schedule please contact [facility] directly at [phone].  Normal or non-critical lab and imaging results will be communicated to you by MyChart, letter or phone within 4 business days after the clinic has received the results. If you do not hear from us within 7 days, please contact the clinic through MyChart or phone. If you have a critical or abnormal lab result, we will notify you by phone as soon as possible.  Submit refill requests through "[employer] or call your pharmacy and they will forward the refill request to us. Please allow 3 business days for your refill to be completed.          Additional Information About Your Visit        Care EveryWhere ID     This is your Care EveryWhere ID. This could be used by other organizations to access your [place] medical records  [id number]        Your Vitals Were     Pulse Temperature Last Period Breastfeeding? BMI (Body Mass Index)       89 98.1  F (36.7  C) (Oral) 03/03/2018 (Exact Date) No 30.27 kg/m2        Blood Pressure from Last 3 Encounters:   08/22/18 107/70   08/13/18 102/66   07/20/18 105/67    Weight from Last 3 Encounters:   08/22/18 155 lb (70.3 kg)   08/13/18 155 lb 14.4 oz (70.7 kg)   07/20/18 156 lb 4.9 oz (70.9 kg)              Today, you had the following     No orders found for display       Primary Care Provider Office Phone # Fax #    Brandan Miller [phone]       [address] [facility] [address]        Equal Access to Services     MARILEE WHITMORE AH: Gualberto mckeon  Soivethali, waaxda luqadaha, qaybta kaalmada america, yifan nilarosemary bella angelicasesar chaconsue malcolm. So [facility] [phone].    ATENCIÓN: Si habla mary, tiene a fernandez disposición servicios gratuitos de asistencia lingüística. Ekaterina al [phone].    We comply with applicable federal civil rights laws and [place] laws. We do not discriminate on the basis of race, color, national origin, age, disability, sex, sexual orientation, or gender identity.            Thank you!     Thank you for choosing [facility]  for your care. Our goal is always to provide you with excellent care. Hearing back from our patients is one way we can continue to improve our services. Please take a few minutes to complete the written survey that you may receive in the mail after your visit with us. Thank you!             Your Updated Medication List - Protect others around you: Learn how to safely use, store and throw away your medicines at www.disposemymeds.org.          This list is accurate as of 8/22/18  8:58 AM.  Always use your most recent med list.                   Brand Name Dispense Instructions for use Diagnosis    acetone (Urine) test Strp     20 each    1 strip by In Vitro route daily    Gestational diabetes       * blood glucose monitoring lancets     200 each    Use to test blood sugar 4 times daily or as directed.  Ok to substitute alternative if insurance prefers.    Gestational diabetes       * ONETOUCH DELICA LANCETS 33G Misc     360 each    360 each 4 times daily    Gestational diabetes       blood glucose monitoring meter device kit     1 kit    Use to test blood sugar 4 times daily or as directed.    Gestational diabetes mellitus (GDM), antepartum, gestational diabetes method of control unspecified       * blood glucose monitoring test strip    CONTOUR NEXT TEST    200 each    Use to test blood sugar 4 times daily or as directed.    Gestational diabetes       * blood glucose monitoring test strip    no  brand specified    360 each    Use to test blood sugar 4  times daily or as directed.    Gestational diabetes       insulin isophane human 100 UNIT/ML injection    HumuLIN N KWIKPEN    12 mL    Inject 12 Units Subcutaneous At Bedtime    Gestational diabetes mellitus (GDM) requiring insulin       insulin pen needle 32G X 4 MM    BD BHANU U/F    100 each    Use 1 pen needles daily or as directed.        LEVEMIR FLEXPEN/FLEXTOUCH 100 UNIT/ML injection   Generic drug:  insulin detemir     18 mL    Inject 20 Units Subcutaneous At Bedtime    Insulin controlled gestational diabetes mellitus (GDM) during pregnancy, antepartum       prenatal multivitamin plus iron 27-0.8 MG Tabs per tablet      Take 1 tablet by mouth daily        * Notice:  This list has 4 medication(s) that are the same as other medications prescribed for you. Read the directions carefully, and ask your doctor or other care provider to review them with you.

## 2018-08-22 NOTE — PATIENT INSTRUCTIONS
If you have any questions regarding your visit, Please contact your care team.     [employer]: [phone]    Women s Health CLINIC HOURS TELEPHONE NUMBER       NAVID Harris-      Prakash Mason-Medical Assistant       Monday-[place]  8:00a.m-4:45 p.m  Tuesday-[place]  9:00a.m-11:45 a.m  Wednesday-[place] 8:00a.m-4:45 p.m.  Thursday-[place]  8:00a.m-4:45 p.m.  Friday-[place]  8:00a.m-4:45 p.m. [facility]  [address] [place] [address]  [phone] ask [facility]  [phone] Fax  Imaging [phone]    [facility] Labor and Delivery  [address] [place] [address] [place] [address]  [phone]    [facility]  [address] [place] [address]  [phone] [facility]  [phone] Fax  Imaging [phone]     Urgent Care locations:    [place]        [place] Monday-Friday  5 pm - 9 pm  Saturday and Sunday   9 am - 5 pm    Monday-Friday   11 am - 9 pm  Saturday and Sunday   9 am - 5 pm   [phone]       If you need a medication refill, please contact your pharmacy. Please allow 3 business days for your refill to be completed.  As always, Thank you for trusting us with your healthcare needs!

## 2018-08-23 NOTE — PROGRESS NOTES
No signif signs, symptoms or concerns except some coccydynia, allergies and right ear noise. Glc levels ok on insulin. Right ear appears normal with minimal cerumen- ENT referral. Has MFM follow-up. Here with daughter and . Advice appropriate to gestational age reviewed including pertinent Checklist items. Discussed condition, tests and care plan including RBA. Problem list updated. Hgb next.  A/P:  Monserrat was seen today for prenatal care.    Diagnoses and all orders for this visit:    Right ear pain  -     OTOLARYNGOLOGY REFERRAL    Insulin controlled gestational diabetes mellitus (GDM) during pregnancy, antepartum    Prenatal care, subsequent pregnancy, unspecified trimester    Insulin controlled gestational diabetes mellitus (GDM) in second trimester  -     acetone, Urine, test STRP; 1 strip by In Vitro route daily        Saran Mccullough MD

## 2018-08-31 ENCOUNTER — TRANSFERRED RECORDS (OUTPATIENT)
Dept: HEALTH INFORMATION MANAGEMENT | Facility: CLINIC | Age: 38
End: 2018-08-31

## 2018-09-04 DIAGNOSIS — O24.419 GESTATIONAL DIABETES: Primary | ICD-10-CM

## 2018-09-21 ENCOUNTER — PRENATAL OFFICE VISIT (OUTPATIENT)
Dept: OBGYN | Facility: CLINIC | Age: 38
End: 2018-09-21
Payer: COMMERCIAL

## 2018-09-21 VITALS
DIASTOLIC BLOOD PRESSURE: 79 MMHG | TEMPERATURE: 98 F | BODY MASS INDEX: 30.47 KG/M2 | HEART RATE: 93 BPM | WEIGHT: 156 LBS | SYSTOLIC BLOOD PRESSURE: 117 MMHG

## 2018-09-21 DIAGNOSIS — O99.891 BACK PAIN AFFECTING PREGNANCY, ANTEPARTUM: Primary | ICD-10-CM

## 2018-09-21 DIAGNOSIS — O24.414 INSULIN CONTROLLED GESTATIONAL DIABETES MELLITUS (GDM) DURING PREGNANCY, ANTEPARTUM: ICD-10-CM

## 2018-09-21 DIAGNOSIS — Z34.80 PRENATAL CARE, SUBSEQUENT PREGNANCY, UNSPECIFIED TRIMESTER: ICD-10-CM

## 2018-09-21 DIAGNOSIS — M54.9 BACK PAIN AFFECTING PREGNANCY, ANTEPARTUM: Primary | ICD-10-CM

## 2018-09-21 PROBLEM — O99.019 ANEMIA AFFECTING PREGNANCY, ANTEPARTUM: Status: ACTIVE | Noted: 2018-09-21

## 2018-09-21 LAB — HGB BLD-MCNC: 10.2 G/DL (ref 11.7–15.7)

## 2018-09-21 PROCEDURE — 85018 HEMOGLOBIN: CPT | Performed by: OBSTETRICS & GYNECOLOGY

## 2018-09-21 PROCEDURE — 99207 ZZC PRENATAL VISIT: CPT | Performed by: OBSTETRICS & GYNECOLOGY

## 2018-09-21 PROCEDURE — 36415 COLL VENOUS BLD VENIPUNCTURE: CPT | Performed by: OBSTETRICS & GYNECOLOGY

## 2018-09-21 NOTE — PROGRESS NOTES
No signif signs, symptoms or concerns except had interval [place] evaluation for back/pelvic pain- may try maternity support belt. Family will be complete and considering PPTL- will review more next time and sign consent. Some fatigue and craving dirt- check Hgb. Here with son and . May need split dose insulin. Advice appropriate to gestational age reviewed including pertinent Checklist items. Discussed condition, tests and care plan including RBA. Problem list updated.   A/P:  Monserrat was seen today for prenatal care.    Diagnoses and all orders for this visit:    Back pain affecting pregnancy, antepartum  -     order for DME; Equipment being ordered: maternity support belt    Insulin controlled gestational diabetes mellitus (GDM) during pregnancy, antepartum    Prenatal care, subsequent pregnancy, unspecified trimester  -     Hemoglobin  -     order for DME; Equipment being ordered: maternity support belt        Saran Mccullough MD

## 2018-09-21 NOTE — PATIENT INSTRUCTIONS
If you have any questions regarding your visit, Please contact your care team.     [employer]: [phone]    Women s Health CLINIC HOURS TELEPHONE NUMBER       NAVID Harirs-      Prakash Mason-Medical Assistant       Monday-[place]  8:00a.m-4:45 p.m  Tuesday-[place]  9:00a.m-11:45 a.m  Wednesday-[place] 8:00a.m-4:45 p.m.  Thursday-[place]  8:00a.m-4:45 p.m.  Friday-[place]  8:00a.m-4:45 p.m. [facility]  [address] [place] [address]  [phone] ask [facility]  [phone] Fax  Imaging [phone]    [facility] Labor and Delivery  [address] [place] [address] [place] [address]  [phone]    [facility]  [address] [place] [address]  [phone] [facility]  [phone] Fax  Imaging [phone]     Urgent Care locations:    [place]        [place] Monday-Friday  5 pm - 9 pm  Saturday and Sunday   9 am - 5 pm    Monday-Friday   11 am - 9 pm  Saturday and Sunday   9 am - 5 pm   [phone]       If you need a medication refill, please contact your pharmacy. Please allow 3 business days for your refill to be completed.  As always, Thank you for trusting us with your healthcare needs!

## 2018-09-21 NOTE — MR AVS SNAPSHOT
After Visit Summary   9/21/2018    Monserrat White    MRN: [id number]           Patient Information     Date Of Birth          1980        Visit Information        Provider Department      9/21/2018 11:00 AM Saran Mccullough MD; [employer] [facility]        Today's Diagnoses     Insulin controlled gestational diabetes mellitus (GDM) during pregnancy, antepartum        Prenatal care, subsequent pregnancy, unspecified trimester          Care Instructions                                                        If you have any questions regarding your visit, Please contact your care team.     [facility] Access Services: [phone]    Women s Health CLINIC HOURS TELEPHONE NUMBER       Saran Mccullough M.D.      Beckie-      Prakash Mason-Medical Assistant       Monday-[place]  8:00a.m-4:45 p.m  [facility]  9:00a.m-11:45 a.m  Wednesday-[place] 8:00a.m-4:45 p.m.  [facility]  8:00a.m-4:45 p.m.  [facility]  8:00a.m-4:45 p.m. [facility]  [address]  [place] [address] [phone] ask [facility]  [phone] Fax  Imaging [phone]    [facility] Labor and Delivery  [address] [place] [address] [place] [address]  [phone]    [facility]  [address] [facility] [place] [address] [phone] [facility]  [phone] Fax  Imaging [phone]     Urgent Care locations:    [facility] Monday-Friday  5 pm - 9 pm  Saturday and Sunday   9 am - 5 pm    Monday-Friday   11 am - 9 pm  Saturday and Sunday   9 am - 5 pm   [phone]       If you need a medication refill, please contact your pharmacy. Please allow 3 business days for your refill to be completed.  As always, Thank you for trusting us with your healthcare needs!            Follow-ups after your visit        Who to  "contact     If you have questions or need follow up information about today's clinic visit or your schedule please contact [facility] SILVIA WHITLOCK directly at [phone].  Normal or non-critical lab and imaging results will be communicated to you by MyChart, letter or phone within 4 business days after the clinic has received the results. If you do not hear from us within 7 days, please contact the clinic through MyChart or phone. If you have a critical or abnormal lab result, we will notify you by phone as soon as possible.  Submit refill requests through [employer] or call your pharmacy and they will forward the refill request to us. Please allow 3 business days for your refill to be completed.          Additional Information About Your Visit        [employer] Information     [employer] lets you send messages to your doctor, view your test results, renew your prescriptions, schedule appointments and more. To sign up, go to [email or web address] . Click on \"Log in\" on the left side of the screen, which will take you to the Welcome page. Then click on \"Sign up Now\" on the right side of the page.     You will be asked to enter the access code listed below, as well as some personal information. Please follow the directions to create your username and password.     Your access code is: [email or web address]  Expires: 2018  6:31 AM     Your access code will  in 90 days. If you need help or a new code, please call your [place] clinic or [phone].        Care EveryWhere ID     This is your Care EveryWhere ID. This could be used by other organizations to access your [place] medical records  [id number]        Your Vitals Were     Pulse Temperature Last Period Breastfeeding? BMI (Body Mass Index)       93 98  F (36.7  C) (Oral) 2018 (Exact Date) No 30.47 kg/m2        Blood Pressure from Last 3 Encounters:   18 117/79   18 107/70   18 102/66    Weight from Last 3 Encounters:   18 156 " lb (70.8 kg)   08/22/18 155 lb (70.3 kg)   08/13/18 155 lb 14.4 oz (70.7 kg)              Today, you had the following     No orders found for display         Today's Medication Changes          These changes are accurate as of 9/21/18 11:13 AM.  If you have any questions, ask your nurse or doctor.               Stop taking these medicines if you haven't already. Please contact your care team if you have questions.     insulin isophane human 100 UNIT/ML injection   Commonly known as:  HumuLIN N KWIKPEN   Stopped by:  Sarna Mccullough MD                    Primary Care Provider Office Phone # Fax #    Brandan Miller [phone]       [address] [facility] [address]        Equal Access to Services     MARILEE WHITMORE : Gualberto mckeon [place], waaxda luqadaha, qaybta kaalmada adeegyada, yifan hurtado . So [facility] [place] [facility] [phone].    ATENCIÓN: Si habla español, tiene a fernandez disposición servicios gratuitos de asistencia lingüística. Llame al [phone].    We comply with applicable federal civil rights laws and [place] laws. We do not discriminate on the basis of race, color, national origin, age, disability, sex, sexual orientation, or gender identity.            Thank you!     Thank you for choosing [facility]  for your care. Our goal is always to provide you with excellent care. Hearing back from our patients is one way we can continue to improve our services. Please take a few minutes to complete the written survey that you may receive in the mail after your visit with us. Thank you!             Your Updated Medication List - Protect others around you: Learn how to safely use, store and throw away your medicines at www.disposemymeds.org.          This list is accurate as of 9/21/18 11:13 AM.  Always use your most recent med list.                   Brand Name Dispense Instructions for use Diagnosis    acetone (Urine) test Strp     20  each    1 strip by In Vitro route daily    Insulin controlled gestational diabetes mellitus (GDM) in second trimester       * blood glucose monitoring lancets     200 each    Use to test blood sugar 4 times daily or as directed.  Ok to substitute alternative if insurance prefers.    Gestational diabetes       * ONETOUCH DELICA LANCETS 33G Misc     360 each    360 each 4 times daily    Gestational diabetes       blood glucose monitoring meter device kit     1 kit    Use to test blood sugar 4 times daily or as directed.    Gestational diabetes mellitus (GDM), antepartum, gestational diabetes method of control unspecified       * blood glucose monitoring test strip    CONTOUR NEXT TEST    200 each    Use to test blood sugar 4 times daily or as directed.    Gestational diabetes       * blood glucose monitoring test strip    no brand specified    360 each    Use to test blood sugar 4  times daily or as directed.    Gestational diabetes       * ONETOUCH ULTRA test strip   Generic drug:  blood glucose monitoring     360 each    Use to test blood sugar 4  times daily or as directed.    Gestational diabetes       insulin pen needle 32G X 4 MM    BD BHANU U/F    100 each    Use 1 pen needles daily or as directed.        LEVEMIR FLEXPEN/FLEXTOUCH 100 UNIT/ML injection   Generic drug:  insulin detemir     18 mL    Inject 20 Units Subcutaneous At Bedtime    Insulin controlled gestational diabetes mellitus (GDM) during pregnancy, antepartum       prenatal multivitamin plus iron 27-0.8 MG Tabs per tablet      Take 1 tablet by mouth daily        * Notice:  This list has 5 medication(s) that are the same as other medications prescribed for you. Read the directions carefully, and ask your doctor or other care provider to review them with you.

## 2018-10-10 ENCOUNTER — PRENATAL OFFICE VISIT (OUTPATIENT)
Dept: OBGYN | Facility: CLINIC | Age: 38
End: 2018-10-10
Payer: COMMERCIAL

## 2018-10-10 VITALS
HEART RATE: 95 BPM | TEMPERATURE: 97.8 F | WEIGHT: 158 LBS | BODY MASS INDEX: 30.86 KG/M2 | OXYGEN SATURATION: 98 % | SYSTOLIC BLOOD PRESSURE: 117 MMHG | DIASTOLIC BLOOD PRESSURE: 74 MMHG

## 2018-10-10 DIAGNOSIS — O99.019 ANEMIA AFFECTING PREGNANCY, ANTEPARTUM: ICD-10-CM

## 2018-10-10 DIAGNOSIS — Z34.80 PRENATAL CARE, SUBSEQUENT PREGNANCY, UNSPECIFIED TRIMESTER: ICD-10-CM

## 2018-10-10 DIAGNOSIS — O24.414 INSULIN CONTROLLED GESTATIONAL DIABETES MELLITUS (GDM) DURING PREGNANCY, ANTEPARTUM: ICD-10-CM

## 2018-10-10 PROCEDURE — 99207 ZZC PRENATAL VISIT: CPT | Performed by: OBSTETRICS & GYNECOLOGY

## 2018-10-10 NOTE — PATIENT INSTRUCTIONS
If you have any questions regarding your visit, Please contact your care team.     [employer]: [phone]    Women s Health CLINIC HOURS TELEPHONE NUMBER       NAVID Harris-      Prakash Mason-Medical Assistant       Monday-[place]  8:00a.m-4:45 p.m  Tuesday-[place]  9:00a.m-11:45 a.m  Wednesday-[place] 8:00a.m-4:45 p.m.  Thursday-[place]  8:00a.m-4:45 p.m.  Friday-[place]  8:00a.m-4:45 p.m. [facility]  [address] [place] [address]  [phone] ask [facility]  [phone] Fax  Imaging [phone]    [facility] Labor and Delivery  [address] [place] [address] [place] [address]  [phone]    [facility]  [address] [place] [address]  [phone] [facility]  [phone] Fax  Imaging [phone]     Urgent Care locations:    [place]        [place] Monday-Friday  5 pm - 9 pm  Saturday and Sunday   9 am - 5 pm    Monday-Friday   11 am - 9 pm  Saturday and Sunday   9 am - 5 pm   [phone]       If you need a medication refill, please contact your pharmacy. Please allow 3 business days for your refill to be completed.  As always, Thank you for trusting us with your healthcare needs!

## 2018-10-10 NOTE — MR AVS SNAPSHOT
After Visit Summary   10/10/2018    Monserrat White    MRN: [id number]           Patient Information     Date Of Birth          1980        Visit Information        Provider Department      10/10/2018 2:00 PM Saran Mccullough MD; [place] [employer]        Today's Diagnoses     Anemia affecting pregnancy, antepartum        Gestational diabetes mellitus (GDM), antepartum        Prenatal care, subsequent pregnancy, unspecified trimester          Care Instructions                                                        If you have any questions regarding your visit, Please contact your care team.     [facility] Access Services: [phone]    Women s Health CLINIC HOURS TELEPHONE NUMBER       Saran Mccullough M.D.      Beckie-      Prakash Mason-Medical Assistant       Monday-[place]  8:00a.m-4:45 p.m  [facility]  9:00a.m-11:45 a.m  Wednesday-[place] 8:00a.m-4:45 p.m.  Thursday-[place]  8:00a.m-4:45 p.m.  [facility]  8:00a.m-4:45 p.m. [facility]  [address] [place]  [place] [address] [phone] ask [facility]  [phone] Fax  Imaging [phone]    [facility] Labor and Delivery  [address] [place] [address] [place] [address]  [phone]    [facility]  [address] [facility] [place] [address] [phone] [facility]  [phone] Fax  Imaging [phone]     Urgent Care locations:    [facility] Monday-Friday  5 pm - 9 pm  Saturday and Sunday   9 am - 5 pm    Monday-Friday   11 am - 9 pm  Saturday and Sunday   9 am - 5 pm   [phone]       If you need a medication refill, please contact your pharmacy. Please allow 3 business days for your refill to be completed.  As always, Thank you for trusting us with your healthcare needs!            Follow-ups after your visit         Who to contact     If you have questions or need follow up information about today's clinic visit or your schedule please contact [facility] directly at [phone].  Normal or non-critical lab and imaging results will be communicated to you by MyChart, letter or phone within 4 business days after the clinic has received the results. If you do not hear from us within 7 days, please contact the clinic through MyChart or phone. If you have a critical or abnormal lab result, we will notify you by phone as soon as possible.  Submit refill requests through [employer] or call your pharmacy and they will forward the refill request to us. Please allow 3 business days for your refill to be completed.          Additional Information About Your Visit        Care EveryWhere ID     This is your Care EveryWhere ID. This could be used by other organizations to access your [facility] medical records  [id number]        Your Vitals Were     Pulse Temperature Last Period Pulse Oximetry Breastfeeding? BMI (Body Mass Index)    95 97.8  F (36.6  C) (Oral) 03/03/2018 (Exact Date) 98% No 30.86 kg/m2       Blood Pressure from Last 3 Encounters:   10/10/18 117/74   09/21/18 117/79   08/22/18 107/70    Weight from Last 3 Encounters:   10/10/18 158 lb (71.7 kg)   09/21/18 156 lb (70.8 kg)   08/22/18 155 lb (70.3 kg)              Today, you had the following     No orders found for display       Primary Care Provider Office Phone # Fax #    Brandan Miller [phone]       [address] [facility] [address]        Equal Access to Services     [facility]: Hadii aad ku hadasho Soomaali, waaxda luqadaha, qaybta kaalmada america, yifan fowler. So [facility] [phone].    ATENCIÓN: Si habla español, tiene a fernandez disposición servicios gratuitos de asistencia lingüística. Llame al [phone].    We comply with applicable federal civil rights laws and [place] laws. We do  not discriminate on the basis of race, color, national origin, age, disability, sex, sexual orientation, or gender identity.            Thank you!     Thank you for choosing [facility]  for your care. Our goal is always to provide you with excellent care. Hearing back from our patients is one way we can continue to improve our services. Please take a few minutes to complete the written survey that you may receive in the mail after your visit with us. Thank you!             Your Updated Medication List - Protect others around you: Learn how to safely use, store and throw away your medicines at www.disposemymeds.org.          This list is accurate as of 10/10/18  2:02 PM.  Always use your most recent med list.                   Brand Name Dispense Instructions for use Diagnosis    acetone (urine) test strip    KETOSTIX    20 each    1 strip by In Vitro route daily    Insulin controlled gestational diabetes mellitus (GDM) in second trimester       * blood glucose monitoring lancets     200 each    Use to test blood sugar 4 times daily or as directed.  Ok to substitute alternative if insurance prefers.    Gestational diabetes       * ONETOUCH DELICA LANCETS 33G Misc     360 each    360 each 4 times daily    Gestational diabetes       blood glucose monitoring meter device kit     1 kit    Use to test blood sugar 4 times daily or as directed.    Gestational diabetes mellitus (GDM), antepartum, gestational diabetes method of control unspecified       * blood glucose monitoring test strip    CONTOUR NEXT TEST    200 each    Use to test blood sugar 4 times daily or as directed.    Gestational diabetes       * blood glucose monitoring test strip    no brand specified    360 each    Use to test blood sugar 4  times daily or as directed.    Gestational diabetes       * ONETOUCH ULTRA test strip   Generic drug:  blood glucose monitoring     360 each    Use to test blood sugar 4  times daily or as directed.     Gestational diabetes       insulin pen needle 32G X 4 MM    BD BHANU U/F    100 each    Use 1 pen needles daily or as directed.        LEVEMIR FLEXPEN/FLEXTOUCH 100 UNIT/ML injection   Generic drug:  insulin detemir     18 mL    Inject 20 Units Subcutaneous At Bedtime    Insulin controlled gestational diabetes mellitus (GDM) during pregnancy, antepartum       order for DME     1 Device    Equipment being ordered: maternity support belt    Prenatal care, subsequent pregnancy, unspecified trimester, Back pain affecting pregnancy, antepartum       prenatal multivitamin plus iron 27-0.8 MG Tabs per tablet      Take 1 tablet by mouth daily        * Notice:  This list has 5 medication(s) that are the same as other medications prescribed for you. Read the directions carefully, and ask your doctor or other care provider to review them with you.

## 2018-10-12 NOTE — PROGRESS NOTES
No signif signs, symptoms or concerns except had contractions up to 20 min apart yest. Poor sleep. Glc levels ok on insulin once daily. Taking oral iron. PPTL discussed and consent signed. Plan BPP alternating with NST weekly at 32 weeks. Here with children and . Advice appropriate to gestational age reviewed including pertinent Checklist items. Discussed condition, tests and care plan including RBA. Problem list updated. NST next.  A/P:  Monserrat was seen today for prenatal care.    Diagnoses and all orders for this visit:    Anemia affecting pregnancy, antepartum    Gestational diabetes mellitus (GDM), antepartum    Prenatal care, subsequent pregnancy, unspecified trimester        Saran Mccullough MD

## 2018-10-15 RX ORDER — INSULIN DETEMIR 100 [IU]/ML
25 INJECTION, SOLUTION SUBCUTANEOUS AT BEDTIME
Qty: 21 ML | Refills: 3 | Status: SHIPPED | OUTPATIENT
Start: 2018-10-15

## 2018-10-15 NOTE — PROGRESS NOTES
Patient called with a .  Average blood glucose 122 with a standard deviation of 20 and a range variable between 79 and 171.  On the meter, the pre and post BG numbers are fairly well controlled during the day.  The prebreakfast blood glucose is consistently elevated, in the 120-130 range.  She checks her blood glucose fasting and pre meals and 1 hour postprandially, approximately 4 times daily.  Currently using 20 units of Levemir at bedtime.  She reports going through a significant stressful period, as she deals with problems with her daughter.  She has not been able to keep her appointments but she agreed to make a follow-up appointment in the near future. Meantime, I advised her to increase the bedtime dose of Levemir to 25 U.

## 2018-10-16 ENCOUNTER — TELEPHONE (OUTPATIENT)
Dept: ENDOCRINOLOGY | Facility: CLINIC | Age: 38
End: 2018-10-16

## 2018-10-16 NOTE — TELEPHONE ENCOUNTER
Pharmacy Contact   Telephone Fax   [phone]   Pharmacy Address and Hours   Address Hours   [address] [facility] [address]      PA for levemir submitted, Spoke w/ Naveed at [facility] requesting mercy dispense of insulin until PA approved, as PT is insulin controlled, Naveed spoke w. Manager at [facility] - states they cannot dispense mercy coverage because a PA has been submitted.

## 2018-10-16 NOTE — TELEPHONE ENCOUNTER
URGENT PA needed for Levemir insulin. She is   a Gestational diabetic that cannot use  Basaglar or Lantus  due to pregnancy Levemir is safe with pregnancy Basaglar and lantus are not safe with pregnancy. I saw a PA was sent but  Didn't actual see that it was sent to sorry if duplicate.

## 2018-10-16 NOTE — TELEPHONE ENCOUNTER
Message from pharmacy stating Levemir flexpen is not covered by insurance, Basaglar Kwikpen is preferred. Please send new prescription.    Kylie Amaya [facility]  Adult Endocrinology  [facility]

## 2018-10-17 NOTE — TELEPHONE ENCOUNTER
Prior Authorization Approval    Authorization Effective Date: 9/16/2018  Authorization Expiration Date: 10/16/2019  Medication: LEVEMIR FLEXPEN/FLEXTOUCH 100 UNIT/ML soln-PA INITIATED  Approved Dose/Quantity:   Reference #: [id number]   Insurance Company: [employer] - Phone [phone] Fax [phone]  Expected CoPay:       CoPay Card Available:      Foundation Assistance Needed:    Which Pharmacy is filling the prescription (Not needed for infusion/clinic administered): [id number] - [place], [place] - [address] AT COR[address] OF [facility]  Pharmacy Notified: Yes  Patient Notified: No-Pharmacy will contact

## 2018-10-25 ENCOUNTER — PRENATAL OFFICE VISIT (OUTPATIENT)
Dept: OBGYN | Facility: CLINIC | Age: 38
End: 2018-10-25
Payer: COMMERCIAL

## 2018-10-25 VITALS
HEART RATE: 80 BPM | BODY MASS INDEX: 30.66 KG/M2 | DIASTOLIC BLOOD PRESSURE: 70 MMHG | WEIGHT: 157 LBS | TEMPERATURE: 98 F | SYSTOLIC BLOOD PRESSURE: 109 MMHG

## 2018-10-25 DIAGNOSIS — O24.414 INSULIN CONTROLLED GESTATIONAL DIABETES MELLITUS (GDM) DURING PREGNANCY, ANTEPARTUM: ICD-10-CM

## 2018-10-25 DIAGNOSIS — Z34.80 PRENATAL CARE, SUBSEQUENT PREGNANCY, UNSPECIFIED TRIMESTER: Primary | ICD-10-CM

## 2018-10-25 DIAGNOSIS — Z23 NEED FOR TDAP VACCINATION: ICD-10-CM

## 2018-10-25 DIAGNOSIS — O99.019 ANEMIA AFFECTING PREGNANCY, ANTEPARTUM: ICD-10-CM

## 2018-10-25 DIAGNOSIS — Z23 NEED FOR PROPHYLACTIC VACCINATION AND INOCULATION AGAINST INFLUENZA: ICD-10-CM

## 2018-10-25 PROCEDURE — 59025 FETAL NON-STRESS TEST: CPT | Performed by: OBSTETRICS & GYNECOLOGY

## 2018-10-25 PROCEDURE — 90471 IMMUNIZATION ADMIN: CPT | Performed by: OBSTETRICS & GYNECOLOGY

## 2018-10-25 PROCEDURE — 90472 IMMUNIZATION ADMIN EACH ADD: CPT | Performed by: OBSTETRICS & GYNECOLOGY

## 2018-10-25 PROCEDURE — 99207 ZZC PRENATAL VISIT: CPT | Mod: 25 | Performed by: OBSTETRICS & GYNECOLOGY

## 2018-10-25 PROCEDURE — 90715 TDAP VACCINE 7 YRS/> IM: CPT | Performed by: OBSTETRICS & GYNECOLOGY

## 2018-10-25 PROCEDURE — 90686 IIV4 VACC NO PRSV 0.5 ML IM: CPT | Performed by: OBSTETRICS & GYNECOLOGY

## 2018-10-25 NOTE — PROGRESS NOTES
Injectable Influenza Immunization Documentation    1.  Is the person to be vaccinated sick today?   No    2. Does the person to be vaccinated have an allergy to a component   of the vaccine?   No  Egg Allergy Algorithm Link    3. Has the person to be vaccinated ever had a serious reaction   to influenza vaccine in the past?   No    4. Has the person to be vaccinated ever had Guillain-Barré syndrome?   No    Form completed by Isabella Garcia CMA         Immunization History   Administered Date(s) Administered     Influenza (H1N1) 03/08/2010     Influenza (IIV3) PF 11/12/2007, 09/15/2011, 09/27/2013     Influenza Vaccine IM 3yrs+ 4 Valent IIV4 10/25/2018     Influenza Vaccine, 3 YRS +, IM (QUADRIVALENT W/PRESERVATIVES) 11/19/2014     TDAP Vaccine (Adacel) 08/08/2008, 10/25/2018     TDAP Vaccine (Boostrix) 03/12/2014

## 2018-10-25 NOTE — MR AVS SNAPSHOT
After Visit Summary   10/25/2018    Monserrat White    MRN: [id number]           Patient Information     Date Of Birth          1980        Visit Information        Provider Department      10/25/2018 10:45 AM Saran Mccullough MD; [place] [employer] [facility]        Today's Diagnoses     Anemia affecting pregnancy, antepartum        Gestational diabetes mellitus (GDM), antepartum        Prenatal care, subsequent pregnancy, unspecified trimester          Care Instructions                                                        If you have any questions regarding your visit, Please contact your care team.     [facility] Access Services: [phone]    Women s Health CLINIC HOURS TELEPHONE NUMBER       Saran Mccullough M.D.      Beckie-      Prakash Mason-Medical Assistant       Monday-[place]  8:00a.m-4:45 p.m  [facility]  9:00a.m-11:45 a.m  Wednesday-[place] 8:00a.m-4:45 p.m.  Thursday-[place]  8:00a.m-4:45 p.m.  [facility]  8:00a.m-4:45 p.m. [facility]  [address] [place]  [place] [address] [phone] ask [facility]  [phone] Fax  Imaging [phone]    [facility] Labor and Delivery  [address] [place] [address] [place] [address]  [phone]    [facility]  [address] [place] [address] [phone] [facility]  [phone] Fax  Imaging [phone]     Urgent Care locations:    [facility] Monday-Friday  5 pm - 9 pm  Saturday and Sunday   9 am - 5 pm    Monday-Friday   11 am - 9 pm  Saturday and Sunday   9 am - 5 pm   [phone]       If you need a medication refill, please contact your pharmacy. Please allow 3 business days for your refill to be completed.  As always, Thank you for trusting us with your healthcare needs!            Follow-ups after your visit         Your next 10 appointments already scheduled     Oct 30, 2018 11:00 AM CDT   (Arrive by 10:45 AM)   RETURN DIABETES with Justine Yarbrough PA-C   [facility] Endocrinology ([facility] [place])    [address] [address] [phone]              Who to contact     If you have questions or need follow up information about today's clinic visit or your schedule please contact [facility] directly at [phone].  Normal or non-critical lab and imaging results will be communicated to you by MyChart, letter or phone within 4 business days after the clinic has received the results. If you do not hear from us within 7 days, please contact the clinic through MyChart or phone. If you have a critical or abnormal lab result, we will notify you by phone as soon as possible.  Submit refill requests through [employer] or call your pharmacy and they will forward the refill request to us. Please allow 3 business days for your refill to be completed.          Additional Information About Your Visit        Care EveryWhere ID     This is your Care EveryWhere ID. This could be used by other organizations to access your [facility] medical records  [id number]        Your Vitals Were     Pulse Temperature Last Period Breastfeeding? BMI (Body Mass Index)       80 98  F (36.7  C) (Oral) 03/03/2018 (Exact Date) No 30.66 kg/m2        Blood Pressure from Last 3 Encounters:   10/25/18 109/70   10/10/18 117/74   09/21/18 117/79    Weight from Last 3 Encounters:   10/25/18 157 lb (71.2 kg)   10/10/18 158 lb (71.7 kg)   09/21/18 156 lb (70.8 kg)              Today, you had the following     No orders found for display       Primary Care Provider Office Phone # Fax #    Brandan Miller [phone]       [address] [facility] [address]        Equal Access to Services     [facility] : Gualberto Burton, cade reyna, sandi mae  yifan crossjose angel wooaan ah. So [facility] [phone].    ATENCIÓN: Si habla mary, tiene a fernandez disposición servicios gratuitos de asistencia lingüística. Ekaterina al [phone].    We comply with applicable federal civil rights laws and [place] laws. We do not discriminate on the basis of race, color, national origin, age, disability, sex, sexual orientation, or gender identity.            Thank you!     Thank you for choosing [facility]  for your care. Our goal is always to provide you with excellent care. Hearing back from our patients is one way we can continue to improve our services. Please take a few minutes to complete the written survey that you may receive in the mail after your visit with us. Thank you!             Your Updated Medication List - Protect others around you: Learn how to safely use, store and throw away your medicines at www.disposemymeds.org.          This list is accurate as of 10/25/18 10:54 AM.  Always use your most recent med list.                   Brand Name Dispense Instructions for use Diagnosis    acetone (urine) test strip    KETOSTIX    20 each    1 strip by In Vitro route daily    Insulin controlled gestational diabetes mellitus (GDM) in second trimester       * blood glucose monitoring lancets     200 each    Use to test blood sugar 4 times daily or as directed.  Ok to substitute alternative if insurance prefers.    Gestational diabetes       * ONETOUCH DELICA LANCETS 33G Misc     360 each    360 each 4 times daily    Gestational diabetes       blood glucose monitoring meter device kit     1 kit    Use to test blood sugar 4 times daily or as directed.    Gestational diabetes mellitus (GDM), antepartum, gestational diabetes method of control unspecified       * blood glucose monitoring test strip    CONTOUR NEXT TEST    200 each    Use to test blood sugar 4 times daily or as directed.    Gestational diabetes       * blood glucose  monitoring test strip    no brand specified    360 each    Use to test blood sugar 4  times daily or as directed.    Gestational diabetes       * ONETOUCH ULTRA test strip   Generic drug:  blood glucose monitoring     360 each    Use to test blood sugar 4  times daily or as directed.    Gestational diabetes       insulin pen needle 32G X 4 MM    BD BHANU U/F    100 each    Use 1 pen needles daily or as directed.        LEVEMIR FLEXPEN/FLEXTOUCH 100 UNIT/ML injection   Generic drug:  insulin detemir     21 mL    Inject 25 Units Subcutaneous At Bedtime    Insulin controlled gestational diabetes mellitus (GDM) during pregnancy, antepartum       order for DME     1 Device    Equipment being ordered: maternity support belt    Prenatal care, subsequent pregnancy, unspecified trimester, Back pain affecting pregnancy, antepartum       prenatal multivitamin plus iron 27-0.8 MG Tabs per tablet      Take 1 tablet by mouth daily        * Notice:  This list has 5 medication(s) that are the same as other medications prescribed for you. Read the directions carefully, and ask your doctor or other care provider to review them with you.

## 2018-10-25 NOTE — PATIENT INSTRUCTIONS
If you have any questions regarding your visit, Please contact your care team.     [employer]: [phone]    Women s Health CLINIC HOURS TELEPHONE NUMBER       NAVID Harris-      Prakash Mason-Medical Assistant       Monday-[place]  8:00a.m-4:45 p.m  Tuesday-[place]  9:00a.m-11:45 a.m  Wednesday-[place] 8:00a.m-4:45 p.m.  Thursday-[place]  8:00a.m-4:45 p.m.  Friday-[place]  8:00a.m-4:45 p.m. [facility]  [address] [place] [address]  [phone] ask [facility]  [phone] Fax  Imaging [phone]    [facility] Labor and Delivery  [address] [place] [address] [place] [address]  [phone]    [facility]  [address] [place] [address]  [phone] Critical access hospital  [phone] Fax  Imaging [phone]     Urgent Care locations:    [place]        [place] Monday-Friday  5 pm - 9 pm  Saturday and Sunday   9 am - 5 pm    Monday-Friday   11 am - 9 pm  Saturday and Sunday   9 am - 5 pm   [phone]       If you need a medication refill, please contact your pharmacy. Please allow 3 business days for your refill to be completed.  As always, Thank you for trusting us with your healthcare needs!

## 2018-10-25 NOTE — NURSING NOTE
Screening Questionnaire for Adult Immunization    Are you sick today?   No   Do you have allergies to medications, food, a vaccine component or latex?   Yes   Have you ever had a serious reaction after receiving a vaccination?   No   Do you have a long-term health problem with heart disease, lung disease, asthma, kidney disease, metabolic disease (e.g. diabetes), anemia, or other blood disorder?   No   Do you have cancer, leukemia, HIV/AIDS, or any other immune system problem?   No   In the past 3 months, have you taken medications that affect  your immune system, such as prednisone, other steroids, or anticancer drugs; drugs for the treatment of rheumatoid arthritis, Crohn s disease, or psoriasis; or have you had radiation treatments?   No   Have you had a seizure, or a brain or other nervous system problem?   No   During the past year, have you received a transfusion of blood or blood     products, or been given immune (gamma) globulin or antiviral drug?   No   For women: Are you pregnant or is there a chance you could become        pregnant during the next month?   Yes   Have you received any vaccinations in the past 4 weeks?   No     Immunization questionnaire Established pregnancy patient.        Per orders of Dr. Mccullough, injection of Tdap and Flu given by Isabella Garcia. Patient instructed to remain in clinic for 15 minutes afterwards, and to report any adverse reaction to me immediately.       Screening performed by Isabella Garcia on 10/25/2018 at 11:46 AM.

## 2018-10-26 NOTE — PROGRESS NOTES
No signif signs, symptoms or concerns except occasional contractions. Glc levels at target on insulin. Checking weekly BPP with MFM also. Tdap and flu vac given. Here with children and .   A non-stress test was performed to assess fetal well-being for the indication(s) noted and condition as described and this was reactive.   Advice appropriate to gestational age reviewed including pertinent Checklist items. Discussed condition, tests and care plan including RBA. Problem list updated. NST next.  A/P:  Monserrat was seen today for prenatal care and flu shot.    Diagnoses and all orders for this visit:    Prenatal care, subsequent pregnancy, unspecified trimester  -     FLU VACCINE, SPLIT VIRUS, IM (QUADRIVALENT) [21131]- >3 YRS  -     Vaccine Administration, Initial [32192]  -     Vaccine Administration, Each Additional [87083]  -     TDAP VACCINE (ADACEL)    Anemia affecting pregnancy, antepartum    Insulin controlled gestational diabetes mellitus (GDM) during pregnancy, antepartum  -     FETAL NON-STRESS TEST    Need for prophylactic vaccination and inoculation against influenza  -     FLU VACCINE, SPLIT VIRUS, IM (QUADRIVALENT) [81700]- >3 YRS  -     Vaccine Administration, Initial [41612]  -     Vaccine Administration, Each Additional [83402]    Need for Tdap vaccination  -     TDAP VACCINE (ADACEL)        Saran Mccullough MD

## 2018-10-29 ENCOUNTER — PATIENT OUTREACH (OUTPATIENT)
Dept: CARE COORDINATION | Facility: CLINIC | Age: 38
End: 2018-10-29

## 2018-11-01 NOTE — PATIENT INSTRUCTIONS
If you have any questions regarding your visit, Please contact your care team.     [employer] [facility]: [phone]    Women s Health CLINIC HOURS TELEPHONE NUMBER       NAVID Harris-      Prakash Mason-Medical Assistant       Monday-[place]  8:00a.m-4:45 p.m  Tuesday-[place]  9:00a.m-11:45 a.m  Wednesday-[place] 8:00a.m-4:45 p.m.  Thursday-[place]  8:00a.m-4:45 p.m.  Friday-[place]  8:00a.m-4:45 p.m. [facility]  [address] [place] [address]  [phone] ask [facility]  [phone] Fax  Imaging [phone]    [facility] Labor and Delivery  [address] [place] [address] [place] [address]  [phone]    [facility]  [address] [address]  [phone] [facility]  [phone] Fax  Imaging [phone]     Urgent Care locations:    [place]        [place] Monday-Friday  5 pm - 9 pm  Saturday and Sunday   9 am - 5 pm    Monday-Friday   11 am - 9 pm  Saturday and Sunday   9 am - 5 pm   [phone]       If you need a medication refill, please contact your pharmacy. Please allow 3 business days for your refill to be completed.  As always, Thank you for trusting us with your healthcare needs!

## 2018-11-02 ENCOUNTER — PRENATAL OFFICE VISIT (OUTPATIENT)
Dept: OBGYN | Facility: CLINIC | Age: 38
End: 2018-11-02
Payer: COMMERCIAL

## 2018-11-02 VITALS
WEIGHT: 156 LBS | BODY MASS INDEX: 30.47 KG/M2 | HEART RATE: 86 BPM | SYSTOLIC BLOOD PRESSURE: 128 MMHG | TEMPERATURE: 97.9 F | DIASTOLIC BLOOD PRESSURE: 76 MMHG

## 2018-11-02 DIAGNOSIS — O99.019 ANEMIA AFFECTING PREGNANCY, ANTEPARTUM: ICD-10-CM

## 2018-11-02 DIAGNOSIS — Z34.80 PRENATAL CARE, SUBSEQUENT PREGNANCY, UNSPECIFIED TRIMESTER: ICD-10-CM

## 2018-11-02 DIAGNOSIS — O24.414 INSULIN CONTROLLED GESTATIONAL DIABETES MELLITUS (GDM) DURING PREGNANCY, ANTEPARTUM: ICD-10-CM

## 2018-11-02 PROCEDURE — 59025 FETAL NON-STRESS TEST: CPT | Performed by: OBSTETRICS & GYNECOLOGY

## 2018-11-02 PROCEDURE — 99207 ZZC PRENATAL VISIT: CPT | Mod: 25 | Performed by: OBSTETRICS & GYNECOLOGY

## 2018-11-02 NOTE — MR AVS SNAPSHOT
After Visit Summary   11/2/2018    Monserrat White    MRN: [id number]           Patient Information     Date Of Birth          1980        Visit Information        Provider Department      11/2/2018 10:45 AM Saran Mccullough MD; [employer] [facility]        Today's Diagnoses     Pregnancy complicated by umbilical cord varix, antepartum, single or unspecified fetus        Anemia affecting pregnancy, antepartum        Insulin controlled gestational diabetes mellitus (GDM) during pregnancy, antepartum        Prenatal care, subsequent pregnancy, unspecified trimester          Care Instructions                                                        If you have any questions regarding your visit, Please contact your care team.     [employer] Access Services: [phone]    [facility] CLINIC HOURS TELEPHONE NUMBER       Saran Mccullough M.D.      Beckie-      Prakash Mason-Medical Assistant       [facility]  8:00a.m-4:45 p.m  Tuesday[facility]  9:00a.m-11:45 a.m  Wednesday[facility] 8:00a.m-4:45 p.m.  Thursday[facility]  8:00a.m-4:45 p.m.  Friday-[place]  8:00a.m-4:45 p.m. [facility]  [address]  [place], [place] [address] [phone] [facility]  [phone] Fax  Imaging [phone]    [facility]  [address] [place] [address]  [phone]    [facility]  [address]  [place] [address]  [phone] [facility]  [phone] Fax  Imaging [phone]     Urgent Care locations:    [facility] Monday-Friday  5 pm - 9 pm  Saturday and Sunday   9 am - 5 pm    Monday-Friday   11 am - 9 pm  Saturday and Sunday   9 am - 5 pm   [phone]       If you need a medication refill, please contact your pharmacy. Please allow 3 business days for your  refill to be completed.  As always, Thank you for trusting us with your healthcare needs!            Follow-ups after your visit        Your next 10 appointments already scheduled     Nov 08, 2018  9:00 AM CST   (Arrive by 8:45 AM)   RETURN DIABETES with SATISH Sanchez [facility] ([facility])    [address]  3rd Floor  [facility] [address]   [phone]            Nov 09, 2018 11:00 AM CST   ESTABLISHED PRENATAL with Saran Mccullough MD   [facility] ([facility])    [address] [id number] [phone]            Nov 16, 2018 11:00 AM CST   ESTABLISHED PRENATAL with Saran Mccullough MD   [facility] ([facility])    [address] [address] [phone]              Who to contact     If you have questions or need follow up information about today's clinic visit or your schedule please contact [facility] directly at [phone].  Normal or non-critical lab and imaging results will be communicated to you by MyChart, letter or phone within 4 business days after the clinic has received the results. If you do not hear from us within 7 days, please contact the clinic through MyChart or phone. If you have a critical or abnormal lab result, we will notify you by phone as soon as possible.  Submit refill requests through [employer] or call your pharmacy and they will forward the refill request to us. Please allow 3 business days for your refill to be completed.          Additional Information About Your Visit        Care EveryWhere ID     This is your Care EveryWhere ID. This could be used by other organizations to access your [facility] medical records  [id number]        Your Vitals Were     Pulse Temperature Last Period Breastfeeding? BMI (Body Mass Index)       86 97.9  F (36.6  C) (Oral) 03/03/2018  (Exact Date) No 30.47 kg/m2        Blood Pressure from Last 3 Encounters:   11/02/18 128/76   10/25/18 109/70   10/10/18 117/74    Weight from Last 3 Encounters:   11/02/18 156 lb (70.8 kg)   10/25/18 157 lb (71.2 kg)   10/10/18 158 lb (71.7 kg)              Today, you had the following     No orders found for display       Primary Care Provider Office Phone # Fax #    Brandan Miller [phone]       [address] [facility] [address]        Equal Access to Services     [facility]: Hadii aad ku hadasho Soomaali, waaxda luqadaha, qaybta kaalmada adeegyada, yifan burr adejose angel hurtado . So [facility] [phone].    ATENCIÓN: Si habla español, tiene a fernandez disposición servicios gratuitos de asistencia lingüística. Llame al [phone].    We comply with applicable federal civil rights laws and [place] laws. We do not discriminate on the basis of race, color, national origin, age, disability, sex, sexual orientation, or gender identity.            Thank you!     Thank you for choosing [facility]  for your care. Our goal is always to provide you with excellent care. Hearing back from our patients is one way we can continue to improve our services. Please take a few minutes to complete the written survey that you may receive in the mail after your visit with us. Thank you!             Your Updated Medication List - Protect others around you: Learn how to safely use, store and throw away your medicines at www.disposemymeds.org.          This list is accurate as of 11/2/18 11:06 AM.  Always use your most recent med list.                   Brand Name Dispense Instructions for use Diagnosis    acetone (urine) test strip    KETOSTIX    20 each    1 strip by In Vitro route daily    Insulin controlled gestational diabetes mellitus (GDM) in second trimester       * blood glucose monitoring lancets     200 each    Use to test blood sugar 4 times daily or as directed.   Ok to substitute alternative if insurance prefers.    Gestational diabetes       * ONETOUCH DELICA LANCETS 33G Misc     360 each    360 each 4 times daily    Gestational diabetes       blood glucose monitoring meter device kit     1 kit    Use to test blood sugar 4 times daily or as directed.    Gestational diabetes mellitus (GDM), antepartum, gestational diabetes method of control unspecified       * blood glucose monitoring test strip    no brand specified    360 each    Use to test blood sugar 4  times daily or as directed.    Gestational diabetes       * ONETOUCH ULTRA test strip   Generic drug:  blood glucose monitoring     360 each    Use to test blood sugar 4  times daily or as directed.    Gestational diabetes       insulin pen needle 32G X 4 MM    BD BHANU U/F    100 each    Use 1 pen needles daily or as directed.        LEVEMIR FLEXPEN/FLEXTOUCH 100 UNIT/ML injection   Generic drug:  insulin detemir     21 mL    Inject 25 Units Subcutaneous At Bedtime    Insulin controlled gestational diabetes mellitus (GDM) during pregnancy, antepartum       order for DME     1 Device    Equipment being ordered: maternity support belt    Prenatal care, subsequent pregnancy, unspecified trimester, Back pain affecting pregnancy, antepartum       prenatal multivitamin plus iron 27-0.8 MG Tabs per tablet      Take 1 tablet by mouth daily        * Notice:  This list has 4 medication(s) that are the same as other medications prescribed for you. Read the directions carefully, and ask your doctor or other care provider to review them with you.

## 2018-11-02 NOTE — PROGRESS NOTES
No signif signs, symptoms or concerns except contractions about 10-20 min apart. Had Labor and Delivery evaluation two days ago and cervix was 0.5 cm. Glc levels at target on insulin. Discussed umbilical cord varix and probable early delivery. Will consider betamethasone further closer to the time. Has MFM appt later today. Fetal growth has been satisfactory. Here with children and .   A non-stress test was performed to assess fetal well-being for the indication(s) noted and condition as described and this was reactive.   Advice appropriate to gestational age reviewed including pertinent Checklist items. Discussed condition, tests and care plan including RBA. Problem list updated. NST and possible GBS next.  A/P:  Monserrat was seen today for prenatal care.    Diagnoses and all orders for this visit:    Pregnancy complicated by umbilical cord varix, antepartum, single or unspecified fetus  -     FETAL NON-STRESS TEST    Anemia affecting pregnancy, antepartum    Insulin controlled gestational diabetes mellitus (GDM) during pregnancy, antepartum  -     FETAL NON-STRESS TEST    Prenatal care, subsequent pregnancy, unspecified trimester        Saran Mccullough MD

## 2018-11-08 ENCOUNTER — OFFICE VISIT (OUTPATIENT)
Dept: ENDOCRINOLOGY | Facility: CLINIC | Age: 38
End: 2018-11-08
Payer: COMMERCIAL

## 2018-11-08 VITALS
SYSTOLIC BLOOD PRESSURE: 119 MMHG | HEART RATE: 74 BPM | WEIGHT: 157.4 LBS | DIASTOLIC BLOOD PRESSURE: 76 MMHG | BODY MASS INDEX: 30.74 KG/M2

## 2018-11-08 DIAGNOSIS — O24.414 INSULIN CONTROLLED GESTATIONAL DIABETES MELLITUS (GDM) DURING PREGNANCY, ANTEPARTUM: Primary | ICD-10-CM

## 2018-11-08 LAB — HBA1C MFR BLD: 5.9 % (ref 4.3–6)

## 2018-11-08 ASSESSMENT — PAIN SCALES - GENERAL: PAINLEVEL: NO PAIN (0)

## 2018-11-08 NOTE — PATIENT INSTRUCTIONS
Es un gran gusta conocerle!    Mis mejores deseos en fernandez parto!    Yo consejo que aumenta fernandez Levemir a 28 unidades cada noche.  Ud. podra y yo creo que debe aumentar en poco fernandez porcion de carbohidrata con desayuno y almuerzo.     Si tiene un bajon de azucar <70 - LLAME en seguida.    Attentamente,    Justine Yarbrough PA-C, MPAS  [facility]  Diabetes, Endocrinology, and Metabolism  [phone] Appointments/Nurse  [phone] Fax  [phone] nurse line - enfermera  [phone] URGENT after hours/weekend Endocrinologist on call

## 2018-11-08 NOTE — MR AVS SNAPSHOT
After Visit Summary   11/8/2018    Monserrat White    MRN: [id number]           Patient Information     Date Of Birth          1980        Visit Information        Provider Department      11/8/2018 8:45 AM Justine Yarbrough PA-C; [facility] Endocrin[facility]        Today's Diagnoses     Insulin controlled gestational diabetes mellitus (GDM) during pregnancy, antepartum    -  1      Care Instructions    Es un gran gusta conocerle!    Mis mejores deseos en fernandez parto!    Yo consejo que aumenta fernandez Levemir a 28 unidades cada noche.  Ud. podra y yo creo que debe aumentar en poco fernandez porcion de carbohidrata con desayuno y almuerzo.     Si tiene un bajon de azucar <70 - LLAME en seguida.    Attentamente,    Justine Yarbrough PA-C, [facility]  [facility]  Diabetes, Endocrinology, and Metabolism  [phone] Appointments/Nurse  [phone] Fax  [phone] [facility]  [phone] URGENT after hours/weekend Endocrinologist on call              Follow-ups after your visit        Your next 10 appointments already scheduled     Nov 09, 2018 10:45 AM CST   ESTABLISHED PRENATAL with Saran Mccullough MD   [address] [address] [phone]            Nov 16, 2018 11:00 AM CST   ESTABLISHED PRENATAL with Saran Mccullough MD   [address] [address] [phone]              Who to contact     Please call your clinic at [phone] to:    Ask questions about your health    Make or cancel appointments    Discuss your medicines    Learn about your test results    Speak to your doctor            Additional Information About Your Visit        Care EveryWhere ID     This is your Care EveryWhere ID. This could be used by other organizations to access your [place]  medical records  [id number]        Your Vitals Were     Pulse Last Period BMI (Body Mass Index)             74 03/03/2018 (Exact Date) 30.74 kg/m2          Blood Pressure from Last 3 Encounters:   11/08/18 119/76   11/02/18 128/76   10/25/18 109/70    Weight from Last 3 Encounters:   11/08/18 71.4 kg (157 lb 6.4 oz)   11/02/18 70.8 kg (156 lb)   10/25/18 71.2 kg (157 lb)              We Performed the Following     Hemoglobin A1c POCT        Primary Care Provider Office Phone # Fax #    Brandan Miller [phone]       [address] [facility] [address]        Equal Access to Services     [facility] : Hadii aad ku hadasho Soivethali, waaxda luqadaha, qaybta kaalmada adeegyada, yifan hurtado . So [facility] [phone].    ATENCIÓN: Si habla español, tiene a fernandez disposición servicios gratuitos de asistencia lingüística. Llame al [phone].    We comply with applicable federal civil rights laws and [place] laws. We do not discriminate on the basis of race, color, national origin, age, disability, sex, sexual orientation, or gender identity.            Thank you!     Thank you for choosing [facility] ENDOCRINOLOGY  for your care. Our goal is always to provide you with excellent care. Hearing back from our patients is one way we can continue to improve our services. Please take a few minutes to complete the written survey that you may receive in the mail after your visit with us. Thank you!             Your Updated Medication List - Protect others around you: Learn how to safely use, store and throw away your medicines at www.disposemymeds.org.          This list is accurate as of 11/8/18 10:10 AM.  Always use your most recent med list.                   Brand Name Dispense Instructions for use Diagnosis    acetone (urine) test strip    KETOSTIX    20 each    1 strip by In Vitro route daily    Insulin controlled gestational diabetes mellitus (GDM) in second  trimester       * blood glucose monitoring lancets     200 each    Use to test blood sugar 4 times daily or as directed.  Ok to substitute alternative if insurance prefers.    Gestational diabetes       * ONETOUCH DELICA LANCETS 33G Misc     360 each    360 each 4 times daily    Gestational diabetes       blood glucose monitoring meter device kit     1 kit    Use to test blood sugar 4 times daily or as directed.    Gestational diabetes mellitus (GDM), antepartum, gestational diabetes method of control unspecified       * blood glucose monitoring test strip    no brand specified    360 each    Use to test blood sugar 4  times daily or as directed.    Gestational diabetes       * ONETOUCH ULTRA test strip   Generic drug:  blood glucose monitoring     360 each    Use to test blood sugar 4  times daily or as directed.    Gestational diabetes       insulin pen needle 32G X 4 MM    BD BHANU U/F    100 each    Use 1 pen needles daily or as directed.        LEVEMIR FLEXPEN/FLEXTOUCH 100 UNIT/ML injection   Generic drug:  insulin detemir     21 mL    Inject 25 Units Subcutaneous At Bedtime    Insulin controlled gestational diabetes mellitus (GDM) during pregnancy, antepartum       order for DME     1 Device    Equipment being ordered: maternity support belt    Prenatal care, subsequent pregnancy, unspecified trimester, Back pain affecting pregnancy, antepartum       prenatal multivitamin plus iron 27-0.8 MG Tabs per tablet      Take 1 tablet by mouth daily        * Notice:  This list has 4 medication(s) that are the same as other medications prescribed for you. Read the directions carefully, and ask your doctor or other care provider to review them with you.

## 2018-11-08 NOTE — NURSING NOTE
Chief Complaint   Patient presents with     RECHECK     GDM     Capillary puncture performed for Hemoglobin A1C test. Patient tolerated well.    Liza Tracy MA

## 2018-11-08 NOTE — LETTER
11/8/2018       RE: Monserrat White  [address]  [facility] [address]     Dear Colleague,    Thank you for referring your patient, Monserrat White, to the [facility] ENDOCRINOLOGY at [facility]. Please see a copy of my visit note below.    [facility]  Endocrinology  Justine Yarbrough PA-C  11/08/2018      Chief Complaint:   Diabetes    History of Present Illness: HPI obtained with this assistance of Irish/English interpretor present in the room  Monserrat White is a 38 year old pregnant female with a history of gestational diabetes who presents for diabetes follow up.     The patient was diagnosed with diabetes during the 12th week of pregnancy. Her last pregnancy was 5 years ago and she did not develop gestational diabetes at that time. The patient remembered having normal glucose readings in the 6 months following her pregnancy. On 08/13/18 her A1c was 5.5% when she saw Dr. Hogan. The patient was originally on NPH during this pregnancy though she wanted to try something new and she was started on 7 units Levemir. This has since increased and she is currently using 25 U Levemir.     Overall the patient has been feeling well. She has met once with diabetes educator Milady at 17 weeks gestation and at that time she did not have a strong appetite. Milady and the patient discussed a diet plan including 2 tortillas per meal. Lately the patient has had a better appetite and she has been eating more including meat, rice, and beans. She likes making quesadillas with cheese, cream and corn tortillas, but always limits herself to 2 small corn tortillas. For calcium she has little milk in the morning but is hesitant because she noticed an increase in sugar. For breakfast she will have coffee, 1 piece toast and rarely milk. The patient normally eats an early dinner though snacks with cucumbers at night. She has been staying away from fruit as she  understands it will make her BG too high. She has tried iron supplements in the past though they upset her stomach. She takes vitamin D and prenatal vitamins.     Her goal is 80-95 and she makes regular checks around 6-7 am during the week and 9 on the weekends. She has been waking up between  which will drop with activity. After breakfast, she ranges between 105-137 which are mostly around 120. She is symptomatic at 80 and will feel dizzy for which she eats vegetables or something healthy. In general, she has been slightly high in the morning and comes down throughout the day.     The patient has not noticed a change in weight during her pregnancy. She states that they are planning to deliver the baby at 36 weeks as they are concerned for the baby's nutrition. She would like to breastfeed and has tried in the past though has struggled with this in the past. She has 4 kids at home.     Blood Glucose Monitoring:  We reviewed glucometer and CGM data together.  Fastin-124  1 hour after breakfast: 105-137, mostly around 120    Hypoglycemia:  She feels dizzy when she is low at 80. Few lows.    A1c: 5.9%    Diabetes monitoring and complications:  CAD: No  Last eye exam results: : Not Found  Last dental exam: not discussed  Microalbuminuria: no data  HTN: No  On Statin: No  On Aspirin: No  Depression: No    Review of Systems:   Pertinent items are noted in HPI.  All other systems are negative.    Active Medications:     Current Outpatient Prescriptions:      acetone, Urine, test STRP, 1 strip by In Vitro route daily, Disp: 20 each, Rfl: 3     blood glucose monitoring (JOE MICROLET) lancets, Use to test blood sugar 4 times daily or as directed.  Ok to substitute alternative if insurance prefers., Disp: 200 each, Rfl: 11     blood glucose monitoring (NO BRAND SPECIFIED) test strip, Use to test blood sugar 4  times daily or as directed., Disp: 360 each, Rfl: 3     blood glucose monitoring (ONE TOUCH ULTRA 2)  meter device kit, Use to test blood sugar 4 times daily or as directed., Disp: 1 kit, Rfl: 0     insulin pen needle (BD BHANU U/F) 32G X 4 MM, Use 1 pen needles daily or as directed., Disp: 100 each, Rfl: 3     LEVEMIR FLEXPEN/FLEXTOUCH 100 UNIT/ML soln, Inject 25 Units Subcutaneous At Bedtime, Disp: 21 mL, Rfl: 3     ONETOUCH DELICA LANCETS 33G MISC, 360 each 4 times daily, Disp: 360 each, Rfl: 1     ONETOUCH ULTRA test strip, Use to test blood sugar 4  times daily or as directed., Disp: 360 each, Rfl: 3     order for DME, Equipment being ordered: maternity support belt, Disp: 1 Device, Rfl: 0     Prenatal Vit-Fe Fumarate-FA (PRENATAL MULTIVITAMIN PLUS IRON) 27-0.8 MG TABS per tablet, Take 1 tablet by mouth daily, Disp: , Rfl:       Allergies:   Lactated ringers; Latex; and Penicillins      Past Medical History:  Past Medical History:   Diagnosis Date     Arachnoid cyst 2012     Impaired glucose tolerance 2012     TB lung, latent 2011    INH treatment; CXR neg     Patient Active Problem List   Diagnosis     Optic nerve swelling OU     Arachnoid cyst     Impaired glucose tolerance     TB lung, latent     Prenatal care, subsequent pregnancy, unspecified trimester     Antepartum multigravida of advanced maternal age     Insulin controlled gestational diabetes mellitus (GDM) during pregnancy, antepartum     Anemia affecting pregnancy, antepartum     Pregnancy complicated by umbilical cord varix, antepartum, single or unspecified fetus        Past Surgical History:  Past Surgical History:   Procedure Laterality Date     C REGULAR ECHO (FL)  2013    normal (murmur benign)     CHOLECYSTECTOMY, LAPOROSCOPIC  2005     DILATION AND CURETTAGE SUCTION  2010     DILATION AND CURETTAGE SUCTION  1/31/2013    Procedure: DILATION AND CURETTAGE SUCTION;  Suction D&C, mac anesthesia, para cervical block;  Surgeon: Saran Mccullough MD;  Location: [facility] OR     ENDOSCOPY  2010    neg     HC INSERTION INTRAUTERINE DEVICE  2008    Mirena  "    HC REMOVE INTRAUTERINE DEVICE  2008       Family History:   Family History   Problem Relation Age of Onset     Hypertension Mother      Diabetes Brother      Cerebrovascular Disease Brother      Hypertension Brother      Alzheimer Disease Father 78     Alzheimer Disease Maternal Grandmother      unknown     Cancer No family hx of      Thyroid Disease No family hx of      Glaucoma No family hx of      Macular Degeneration No family hx of      Breast Cancer No family hx of      Cancer - colorectal No family hx of      Genetic Disorder No family hx of          Social History:   Social History   Substance Use Topics     Smoking status: Never Smoker     Smokeless tobacco: Never Used     Alcohol use No        Physical Exam:   /76  Pulse 74  Wt 71.4 kg (157 lb 6.4 oz)  LMP 03/03/2018 (Exact Date)  BMI 30.74 kg/m2     Wt Readings from Last 10 Encounters:   11/08/18 71.4 kg (157 lb 6.4 oz)   11/02/18 70.8 kg (156 lb)   10/25/18 71.2 kg (157 lb)   10/10/18 71.7 kg (158 lb)   09/21/18 70.8 kg (156 lb)   08/22/18 70.3 kg (155 lb)   08/13/18 70.7 kg (155 lb 14.4 oz)   07/20/18 70.9 kg (156 lb 4.9 oz)   07/19/18 70.8 kg (156 lb)   06/29/18 70.8 kg (156 lb)      This is a warm and pleasant gravid individual accompanied by her daughter.    Mood is \"good,\"  affect is appropriate,    Her eyes are clear without proptosis, with healthy appearing lens, conjunctiva and sclera.  EOMs intact.  Nares are patent.  Neck is supple without mass or JVD noted.    Respirations are easy and unlabored.  Skin is warm moist and elastic without lesions noted.  No edema is appreciated in lower extremities.  No pedal lesions are appreciated and sensation is grossly intact.         Data:  Lab Results   Component Value Date     05/30/2018    POTASSIUM 3.7 05/30/2018    CHLORIDE 105 05/30/2018    CO2 19 (L) 05/30/2018    ANIONGAP 13 05/30/2018     (H) 05/30/2018    BUN 7 05/30/2018    CR 0.42 (L) 05/30/2018    ZA 9.2 05/30/2018 "     Lab Results   Component Value Date    GFRESTIMATED >90 05/30/2018    GFRESTIMATED >90  Non  GFR Calc   12/08/2014    GFRESTIMATED >90 01/31/2013    GFRESTBLACK >90 05/30/2018    GFRESTBLACK >90   GFR Calc   12/08/2014    GFRESTBLACK >90 01/31/2013      No results found for: MICROL, UMALCR     Lab Results   Component Value Date    A1C 5.7 (H) 06/29/2018    A1C 5.9 12/08/2014    A1C 5.5 12/14/2012    A1C 5.7 07/26/2012    A1C 5.5 02/29/2012    HEMOGLOBINA1 5.9 11/08/2018     No results found for: CPEPT, GADAB, ISCAB    Lab Results   Component Value Date    CHOL 204 (H) 12/08/2014    CHOL 202 (H) 10/06/2011    TRIG 179 (H) 12/08/2014    TRIG 188 (H) 10/06/2011    HDL 53 12/08/2014    HDL 53 10/06/2011     12/08/2014     10/06/2011       Assessment and Plan:  Insulin controlled gestational diabetes mellitus (GDM) during pregnancy, antepartum  The patient is a well controlled gestational diabetic though not meeting her fasting goals currently on a strict diet. We will increase her Levemir to 28 units. She should also increase her carbohydrate intake with 15-30 grams at lunch and 15 g snack   to correlate with the increase in insulin and for improved nutrition. As she is getting limited dairy and no fruit servings, I am advising that she add more of these to her diet.       Discussed recommendation of 30+ minutes daily physical activity, heart healthy diet and weight maintenance to reduce risk of type 2 DM.    - Hemoglobin A1c POCT      Follow-up: OGTT at 6 week post partum.  Advise f/u in endo if abnormal, otherwise annual screen with A1c thereafter.      >50% of 40 minute visit spent in face to face counseling, education and coordination of care related to options for better glycemic control as well as preventing, detecting, and treating hypoglycemia and future complications.       It is my privilege to be involved in the care of the above patient.     Justine Ohara  Linnea COVARRUBIAS, [facility]  [facility]  Diabetes, Endocrinology, and Metabolism  [phone] Appointments/Nurse  [phone] Fax  [phone] pager  [phone] nurse line            Scribe Disclosure:   I, Esa Archibald, am serving as a scribe to document services personally performed by Justine Yarbrough PA-C at this visit, based upon the provider's statements to me. All documentation has been reviewed by the aforementioned provider prior to being entered into the official medical record.     Portions of this medical record were completed by a scribe. UPON MY REVIEW AND AUTHENTICATION BY ELECTRONIC SIGNATURE, this confirms (a) I performed the applicable clinical services, and (b) the record is accurate.

## 2018-11-08 NOTE — PROGRESS NOTES
[facility]  Endocrinology  Justine Yarbrough PA-C  11/08/2018      Chief Complaint:   Diabetes    History of Present Illness: HPI obtained with this assistance of Haitian/English interpretor present in the room  Monserrat White is a 38 year old pregnant female with a history of gestational diabetes who presents for diabetes follow up.     The patient was diagnosed with diabetes during the 12th week of pregnancy. Her last pregnancy was 5 years ago and she did not develop gestational diabetes at that time. The patient remembered having normal glucose readings in the 6 months following her pregnancy. On 08/13/18 her A1c was 5.5% when she saw Dr. Hogan. The patient was originally on NPH during this pregnancy though she wanted to try something new and she was started on 7 units Levemir. This has since increased and she is currently using 25 U Levemir.     Overall the patient has been feeling well. She has met once with diabetes educator Milady at 17 weeks gestation and at that time she did not have a strong appetite. Milady and the patient discussed a diet plan including 2 tortillas per meal. Lately the patient has had a better appetite and she has been eating more including meat, rice, and beans. She likes making quesadillas with cheese, cream and corn tortillas, but always limits herself to 2 small corn tortillas. For calcium she has little milk in the morning but is hesitant because she noticed an increase in sugar. For breakfast she will have coffee, 1 piece toast and rarely milk. The patient normally eats an early dinner though snacks with cucumbers at night. She has been staying away from fruit as she understands it will make her BG too high. She has tried iron supplements in the past though they upset her stomach. She takes vitamin D and prenatal vitamins.     Her goal is 80-95 and she makes regular checks around 6-7 am during the week and 9 on the weekends. She has been waking up between  which will  drop with activity. After breakfast, she ranges between 105-137 which are mostly around 120. She is symptomatic at 80 and will feel dizzy for which she eats vegetables or something healthy. In general, she has been slightly high in the morning and comes down throughout the day.     The patient has not noticed a change in weight during her pregnancy. She states that they are planning to deliver the baby at 36 weeks as they are concerned for the baby's nutrition. She would like to breastfeed and has tried in the past though has struggled with this in the past. She has 4 kids at home.     Blood Glucose Monitoring:  We reviewed glucometer and CGM data together.  Fastin-124  1 hour after breakfast: 105-137, mostly around 120    Hypoglycemia:  She feels dizzy when she is low at 80. Few lows.    A1c: 5.9%    Diabetes monitoring and complications:  CAD: No  Last eye exam results: : Not Found  Last dental exam: not discussed  Microalbuminuria: no data  HTN: No  On Statin: No  On Aspirin: No  Depression: No    Review of Systems:   Pertinent items are noted in HPI.  All other systems are negative.    Active Medications:     Current Outpatient Prescriptions:      acetone, Urine, test STRP, 1 strip by In Vitro route daily, Disp: 20 each, Rfl: 3     blood glucose monitoring (JOE MICROLET) lancets, Use to test blood sugar 4 times daily or as directed.  Ok to substitute alternative if insurance prefers., Disp: 200 each, Rfl: 11     blood glucose monitoring (NO BRAND SPECIFIED) test strip, Use to test blood sugar 4  times daily or as directed., Disp: 360 each, Rfl: 3     blood glucose monitoring (ONE TOUCH ULTRA 2) meter device kit, Use to test blood sugar 4 times daily or as directed., Disp: 1 kit, Rfl: 0     insulin pen needle (BD BHANU U/F) 32G X 4 MM, Use 1 pen needles daily or as directed., Disp: 100 each, Rfl: 3     LEVEMIR FLEXPEN/FLEXTOUCH 100 UNIT/ML soln, Inject 25 Units Subcutaneous At Bedtime, Disp: 21 mL, Rfl:  3     ONETOUCH DELICA LANCETS 33G MISC, 360 each 4 times daily, Disp: 360 each, Rfl: 1     ONETOUCH ULTRA test strip, Use to test blood sugar 4  times daily or as directed., Disp: 360 each, Rfl: 3     order for DME, Equipment being ordered: maternity support belt, Disp: 1 Device, Rfl: 0     Prenatal Vit-Fe Fumarate-FA (PRENATAL MULTIVITAMIN PLUS IRON) 27-0.8 MG TABS per tablet, Take 1 tablet by mouth daily, Disp: , Rfl:       Allergies:   Lactated ringers; Latex; and Penicillins      Past Medical History:  Past Medical History:   Diagnosis Date     Arachnoid cyst 2012     Impaired glucose tolerance 2012     TB lung, latent 2011    INH treatment; CXR neg     Patient Active Problem List   Diagnosis     Optic nerve swelling OU     Arachnoid cyst     Impaired glucose tolerance     TB lung, latent     Prenatal care, subsequent pregnancy, unspecified trimester     Antepartum multigravida of advanced maternal age     Insulin controlled gestational diabetes mellitus (GDM) during pregnancy, antepartum     Anemia affecting pregnancy, antepartum     Pregnancy complicated by umbilical cord varix, antepartum, single or unspecified fetus        Past Surgical History:  Past Surgical History:   Procedure Laterality Date     C REGULAR ECHO (FL)  2013    normal (murmur benign)     CHOLECYSTECTOMY, LAPOROSCOPIC  2005     DILATION AND CURETTAGE SUCTION  2010     DILATION AND CURETTAGE SUCTION  1/31/2013    Procedure: DILATION AND CURETTAGE SUCTION;  Suction D&C, mac anesthesia, para cervical block;  Surgeon: Saran Mccullough MD;  Location: [facility] OR     ENDOSCOPY  2010    neg     HC INSERTION INTRAUTERINE DEVICE  2008    Mirena     HC REMOVE INTRAUTERINE DEVICE  2008       Family History:   Family History   Problem Relation Age of Onset     Hypertension Mother      Diabetes Brother      Cerebrovascular Disease Brother      Hypertension Brother      Alzheimer Disease Father 78     Alzheimer Disease Maternal Grandmother      unknown      "Cancer No family hx of      Thyroid Disease No family hx of      Glaucoma No family hx of      Macular Degeneration No family hx of      Breast Cancer No family hx of      Cancer - colorectal No family hx of      Genetic Disorder No family hx of          Social History:   Social History   Substance Use Topics     Smoking status: Never Smoker     Smokeless tobacco: Never Used     Alcohol use No        Physical Exam:   /76  Pulse 74  Wt 71.4 kg (157 lb 6.4 oz)  LMP 03/03/2018 (Exact Date)  BMI 30.74 kg/m2     Wt Readings from Last 10 Encounters:   11/08/18 71.4 kg (157 lb 6.4 oz)   11/02/18 70.8 kg (156 lb)   10/25/18 71.2 kg (157 lb)   10/10/18 71.7 kg (158 lb)   09/21/18 70.8 kg (156 lb)   08/22/18 70.3 kg (155 lb)   08/13/18 70.7 kg (155 lb 14.4 oz)   07/20/18 70.9 kg (156 lb 4.9 oz)   07/19/18 70.8 kg (156 lb)   06/29/18 70.8 kg (156 lb)      This is a warm and pleasant gravid individual accompanied by her daughter.    Mood is \"good,\"  affect is appropriate,    Her eyes are clear without proptosis, with healthy appearing lens, conjunctiva and sclera.  EOMs intact.  Nares are patent.  Neck is supple without mass or JVD noted.    Respirations are easy and unlabored.  Skin is warm moist and elastic without lesions noted.  No edema is appreciated in lower extremities.  No pedal lesions are appreciated and sensation is grossly intact.         Data:  Lab Results   Component Value Date     05/30/2018    POTASSIUM 3.7 05/30/2018    CHLORIDE 105 05/30/2018    CO2 19 (L) 05/30/2018    ANIONGAP 13 05/30/2018     (H) 05/30/2018    BUN 7 05/30/2018    CR 0.42 (L) 05/30/2018    ZA 9.2 05/30/2018     Lab Results   Component Value Date    GFRESTIMATED >90 05/30/2018    GFRESTIMATED >90  Non  GFR Calc   12/08/2014    GFRESTIMATED >90 01/31/2013    GFRESTBLACK >90 05/30/2018    GFRESTBLACK >90   GFR Calc   12/08/2014    GFRESTBLACK >90 01/31/2013      No results found for: " MICROL, UMALCR     Lab Results   Component Value Date    A1C 5.7 (H) 06/29/2018    A1C 5.9 12/08/2014    A1C 5.5 12/14/2012    A1C 5.7 07/26/2012    A1C 5.5 02/29/2012    HEMOGLOBINA1 5.9 11/08/2018     No results found for: CPEPT, GADAB, ISCAB    Lab Results   Component Value Date    CHOL 204 (H) 12/08/2014    CHOL 202 (H) 10/06/2011    TRIG 179 (H) 12/08/2014    TRIG 188 (H) 10/06/2011    HDL 53 12/08/2014    HDL 53 10/06/2011     12/08/2014     10/06/2011       Assessment and Plan:  Insulin controlled gestational diabetes mellitus (GDM) during pregnancy, antepartum  The patient is a well controlled gestational diabetic though not meeting her fasting goals currently on a strict diet. We will increase her Levemir to 28 units. She should also increase her carbohydrate intake with 15-30 grams at lunch and 15 g snack   to correlate with the increase in insulin and for improved nutrition. As she is getting limited dairy and no fruit servings, I am advising that she add more of these to her diet.       Discussed recommendation of 30+ minutes daily physical activity, heart healthy diet and weight maintenance to reduce risk of type 2 DM.    - Hemoglobin A1c POCT      Follow-up: OGTT at 6 week post partum.  Advise f/u in endo if abnormal, otherwise annual screen with A1c thereafter.      >50% of 40 minute visit spent in face to face counseling, education and coordination of care related to options for better glycemic control as well as preventing, detecting, and treating hypoglycemia and future complications.       It is my privilege to be involved in the care of the above patient.     Justine Yarbrough PA-C, MPAS  [facility]  Diabetes, Endocrinology, and Metabolism  [phone] Appointments/Nurse  [phone] Fax  [phone] pager  [phone] nurse line            Scribe Disclosure:   I, Esa Archibald, am serving as a scribe to document services personally performed by Justine Yarbrough,  SATISH at this visit, based upon the provider's statements to me. All documentation has been reviewed by the aforementioned provider prior to being entered into the official medical record.     Portions of this medical record were completed by a scribe. UPON MY REVIEW AND AUTHENTICATION BY ELECTRONIC SIGNATURE, this confirms (a) I performed the applicable clinical services, and (b) the record is accurate.

## 2018-11-09 ENCOUNTER — PRENATAL OFFICE VISIT (OUTPATIENT)
Dept: OBGYN | Facility: CLINIC | Age: 38
End: 2018-11-09
Payer: COMMERCIAL

## 2018-11-09 VITALS
SYSTOLIC BLOOD PRESSURE: 126 MMHG | HEART RATE: 89 BPM | DIASTOLIC BLOOD PRESSURE: 78 MMHG | BODY MASS INDEX: 30.62 KG/M2 | WEIGHT: 156.8 LBS

## 2018-11-09 DIAGNOSIS — Z34.80 PRENATAL CARE, SUBSEQUENT PREGNANCY, UNSPECIFIED TRIMESTER: ICD-10-CM

## 2018-11-09 DIAGNOSIS — O24.414 INSULIN CONTROLLED GESTATIONAL DIABETES MELLITUS (GDM) DURING PREGNANCY, ANTEPARTUM: ICD-10-CM

## 2018-11-09 DIAGNOSIS — O99.019 ANEMIA AFFECTING PREGNANCY, ANTEPARTUM: ICD-10-CM

## 2018-11-09 PROCEDURE — 59025 FETAL NON-STRESS TEST: CPT | Performed by: OBSTETRICS & GYNECOLOGY

## 2018-11-09 PROCEDURE — 87653 STREP B DNA AMP PROBE: CPT | Performed by: OBSTETRICS & GYNECOLOGY

## 2018-11-09 PROCEDURE — 99207 ZZC PRENATAL VISIT: CPT | Mod: 25 | Performed by: OBSTETRICS & GYNECOLOGY

## 2018-11-09 NOTE — MR AVS SNAPSHOT
After Visit Summary   11/9/2018    Monserrat White    MRN: [id number]           Patient Information     Date Of Birth          1980        Visit Information        Provider Department      11/9/2018 10:45 AM Saran Mccullough MD; [place] [employer] [facility]        Today's Diagnoses     Pregnancy complicated by umbilical cord varix, antepartum, single or unspecified fetus        Anemia affecting pregnancy, antepartum        Insulin controlled gestational diabetes mellitus (GDM) during pregnancy, antepartum        Prenatal care, subsequent pregnancy, unspecified trimester          Care Instructions                                                        If you have any questions regarding your visit, Please contact your care team.     [employer]: [phone]    [facility] CLINIC HOURS TELEPHONE NUMBER       Saran Mccullough M.D.      Beckie-      Prakash Mason-Medical Assistant       [facility]  8:00[facility].m-4:45 p.m  [facility]  9:00a.m-11:45 a.m  [facility] 8:00a.m-4:45 p.m.  [facility]  8:00a.m-4:45 p.m.  [facility]  8:00a.m-4:45 p.m. [facility]  [address]  [place], [place] [address] [phone] [facility]  [phone] Fax  Imaging [phone]    [facility] Labor and Delivery  [address] [place] [address]  [phone]    [facility]  [address]  [place] [address]  [phone] ask [facility]  [phone] Fax  Imaging [phone]     Urgent Care locations:    [facility] Monday-Friday  5 pm - 9 pm  Saturday and Sunday   9 am - 5 pm    Monday-Friday   11 am - 9 pm  Saturday and Sunday   9 am - 5 pm   [phone]       If you need a medication refill, please contact your pharmacy. Please allow 3 business days for your  refill to be completed.  As always, Thank you for trusting us with your healthcare needs!            Follow-ups after your visit        Your next 10 appointments already scheduled     Nov 16, 2018 11:00 AM CST   ESTABLISHED PRENATAL with Saran Mccullough MD   [facility] ([facility]    [address] [place] [id number] [phone]              Who to contact     If you have questions or need follow up information about today's clinic visit or your schedule please contact [facility] directly at [phone].  Normal or non-critical lab and imaging results will be communicated to you by MyChart, letter or phone within 4 business days after the clinic has received the results. If you do not hear from us within 7 days, please contact the clinic through MyChart or phone. If you have a critical or abnormal lab result, we will notify you by phone as soon as possible.  Submit refill requests through [employer] or call your pharmacy and they will forward the refill request to us. Please allow 3 business days for your refill to be completed.          Additional Information About Your Visit        Care EveryWhere ID     This is your Care EveryWhere ID. This could be used by other organizations to access your [place] medical records  [id number]        Your Vitals Were     Pulse Last Period BMI (Body Mass Index)             89 03/03/2018 (Exact Date) 30.62 kg/m2          Blood Pressure from Last 3 Encounters:   11/09/18 126/78   11/08/18 119/76   11/02/18 128/76    Weight from Last 3 Encounters:   11/09/18 156 lb 12.8 oz (71.1 kg)   11/08/18 157 lb 6.4 oz (71.4 kg)   11/02/18 156 lb (70.8 kg)              Today, you had the following     No orders found for display       Primary Care Provider Office Phone # Fax #    Brandan Miller [phone] [address] [facility] [address]        Equal Access to Services      MARILEE WHITMORE : Hadii aad nani linda Sopavel, waaxda luqadaha, qaybta kaalmada aderakeshhero, yifan dominguezlitzydom hurtado . So [facility] [phone].    ATENCIÓN: Si habla español, tiene a fernandez disposición servicios gratuitos de asistencia lingüística. Llame al [phone].    We comply with applicable federal civil rights laws and [place] laws. We do not discriminate on the basis of race, color, national origin, age, disability, sex, sexual orientation, or gender identity.            Thank you!     Thank you for choosing [facility]  for your care. Our goal is always to provide you with excellent care. Hearing back from our patients is one way we can continue to improve our services. Please take a few minutes to complete the written survey that you may receive in the mail after your visit with us. Thank you!             Your Updated Medication List - Protect others around you: Learn how to safely use, store and throw away your medicines at www.disposemymeds.org.          This list is accurate as of 11/9/18 10:55 AM.  Always use your most recent med list.                   Brand Name Dispense Instructions for use Diagnosis    acetone (urine) test strip    KETOSTIX    20 each    1 strip by In Vitro route daily    Insulin controlled gestational diabetes mellitus (GDM) in second trimester       * blood glucose monitoring lancets     200 each    Use to test blood sugar 4 times daily or as directed.  Ok to substitute alternative if insurance prefers.    Gestational diabetes       * ONETOUCH DELICA LANCETS 33G Misc     360 each    360 each 4 times daily    Gestational diabetes       blood glucose monitoring meter device kit     1 kit    Use to test blood sugar 4 times daily or as directed.    Gestational diabetes mellitus (GDM), antepartum, gestational diabetes method of control unspecified       * blood glucose monitoring test strip    no brand specified    360 each    Use to test blood sugar  4  times daily or as directed.    Gestational diabetes       * ONETOUCH ULTRA test strip   Generic drug:  blood glucose monitoring     360 each    Use to test blood sugar 4  times daily or as directed.    Gestational diabetes       insulin pen needle 32G X 4 MM    BD BHANU U/F    100 each    Use 1 pen needles daily or as directed.        LEVEMIR FLEXPEN/FLEXTOUCH 100 UNIT/ML injection   Generic drug:  insulin detemir     21 mL    Inject 25 Units Subcutaneous At Bedtime    Insulin controlled gestational diabetes mellitus (GDM) during pregnancy, antepartum       order for DME     1 Device    Equipment being ordered: maternity support belt    Prenatal care, subsequent pregnancy, unspecified trimester, Back pain affecting pregnancy, antepartum       prenatal multivitamin plus iron 27-0.8 MG Tabs per tablet      Take 1 tablet by mouth daily        * Notice:  This list has 4 medication(s) that are the same as other medications prescribed for you. Read the directions carefully, and ask your doctor or other care provider to review them with you.

## 2018-11-09 NOTE — PROGRESS NOTES
No signif signs, symptoms or concerns. Here with family and  interprets. Glc levels at target on insulin. Has MFM follow-up today. Plan induction at 36-37 weeks.   A non-stress test was performed to assess fetal well-being for the indication(s) noted and condition as described and this was reactive.   Advice appropriate to gestational age reviewed including pertinent Checklist items. Discussed condition, tests and care plan including RBA. Problem list updated. NST next.  A/P:  Monserrat was seen today for prenatal care.    Diagnoses and all orders for this visit:    Pregnancy complicated by umbilical cord varix, antepartum, single or unspecified fetus  -     FETAL NON-STRESS TEST    Anemia affecting pregnancy, antepartum    Insulin controlled gestational diabetes mellitus (GDM) during pregnancy, antepartum  -     FETAL NON-STRESS TEST    Prenatal care, subsequent pregnancy, unspecified trimester  -     Group B strep PCR        Saran Mccullough MD

## 2018-11-09 NOTE — PATIENT INSTRUCTIONS
If you have any questions regarding your visit, Please contact your care team.     [employer] [facility] [place] [employer] [facility]: [phone]    Women s Health CLINIC HOURS TELEPHONE NUMBER       NAVID Harris-      Prakash Mason-Medical Assistant       Monday-[place]  8:00a.m-4:45 p.m  Tuesday-[place]  9:00a.m-11:45 a.m  Wednesday-[place] 8:00a.m-4:45 p.m.  Thursday-[place]  8:00a.m-4:45 p.m.  Friday-[place]  8:00a.m-4:45 p.m. [facility]  [address] [place] [address]  [phone] ask [facility]  [phone] Fax  Imaging [phone]     Labor and Delivery  [address] [place] [address] [place] [address]  [phone]    [facility]  [address] [place] [address]  [phone] [facility]  [phone] Fax  Imaging [phone]     Urgent Care locations:    [place]        [place] Monday-Friday  5 pm - 9 pm  Saturday and Sunday   9 am - 5 pm    Monday-Friday   11 am - 9 pm  Saturday and Sunday   9 am - 5 pm   [phone]       If you need a medication refill, please contact your pharmacy. Please allow 3 business days for your refill to be completed.  As always, Thank you for trusting us with your healthcare needs!

## 2018-11-10 LAB
GP B STREP DNA SPEC QL NAA+PROBE: NEGATIVE
SPECIMEN SOURCE: NORMAL

## 2018-11-13 ENCOUNTER — TRANSFERRED RECORDS (OUTPATIENT)
Dept: HEALTH INFORMATION MANAGEMENT | Facility: CLINIC | Age: 38
End: 2018-11-13

## 2018-11-14 ENCOUNTER — TELEPHONE (OUTPATIENT)
Dept: OBGYN | Facility: CLINIC | Age: 38
End: 2018-11-14

## 2018-11-14 NOTE — TELEPHONE ENCOUNTER
Reason for Call:  Same Day Appointment, Requested Provider:  Dr. Saran Mccullough    PCP: Brandan Miller    Reason for visit: ob ck    Additional comments: Pt's Spouse claling and would tang to know if Dr. Mccullough can fit Pt in early or after 11 am   on 11/16/18 for Pt has an apt before hers with Dr. Mccullough at 11 am and that will not work.     Can we leave a detailed message on this number? YES    Phone number patient can be reached at: Home number on file [phone] (home)    Best Time: anytime    Call taken on 11/14/2018 at 1:14 PM by Enrike Duron

## 2018-11-16 ENCOUNTER — PRENATAL OFFICE VISIT (OUTPATIENT)
Dept: OBGYN | Facility: CLINIC | Age: 38
End: 2018-11-16
Payer: COMMERCIAL

## 2018-11-16 ENCOUNTER — TELEPHONE (OUTPATIENT)
Dept: OBGYN | Facility: CLINIC | Age: 38
End: 2018-11-16

## 2018-11-16 VITALS
HEART RATE: 85 BPM | SYSTOLIC BLOOD PRESSURE: 121 MMHG | BODY MASS INDEX: 30.86 KG/M2 | TEMPERATURE: 98 F | WEIGHT: 158 LBS | DIASTOLIC BLOOD PRESSURE: 74 MMHG

## 2018-11-16 DIAGNOSIS — Z34.80 PRENATAL CARE, SUBSEQUENT PREGNANCY, UNSPECIFIED TRIMESTER: ICD-10-CM

## 2018-11-16 DIAGNOSIS — O24.414 INSULIN CONTROLLED GESTATIONAL DIABETES MELLITUS (GDM) DURING PREGNANCY, ANTEPARTUM: ICD-10-CM

## 2018-11-16 DIAGNOSIS — O99.019 ANEMIA AFFECTING PREGNANCY, ANTEPARTUM: ICD-10-CM

## 2018-11-16 PROCEDURE — 59025 FETAL NON-STRESS TEST: CPT | Performed by: OBSTETRICS & GYNECOLOGY

## 2018-11-16 PROCEDURE — 99207 ZZC PRENATAL VISIT: CPT | Mod: 25 | Performed by: OBSTETRICS & GYNECOLOGY

## 2018-11-16 NOTE — TELEPHONE ENCOUNTER
Received phone call from [facility]- - they are currently seeing patient.  Patient states she is having some leakage of possible fluid.  [facility] would like patient to be triaged by OB RN.  Patient needs .  Please call at phone number listed in chart.

## 2018-11-16 NOTE — MR AVS SNAPSHOT
After Visit Summary   11/16/2018    Monserrat White    MRN: [id number]           Patient Information     Date Of Birth          1980        Visit Information        Provider Department      11/16/2018 1:30 PM Open, Assignments; Saran Mccullough MD [facility]        Today's Diagnoses     Pregnancy complicated by umbilical cord varix, antepartum, single or unspecified fetus        Anemia affecting pregnancy, antepartum        Insulin controlled gestational diabetes mellitus (GDM) during pregnancy, antepartum        Prenatal care, subsequent pregnancy, unspecified trimester          Care Instructions                                                        If you have any questions regarding your visit, Please contact your care team.     [employer] [facility]: [phone]    [facility] HOURS TELEPHONE NUMBER       Saran Mccullough M.D.      Beckie-      Prakash Mason-Medical Assistant       [facility]  8:00a.m-4:45 p.m  [facility]  9:00a.m-11:45 a.m  [facility] 8:00a.m-4:45 p.m.  [facility]  8:00a.m-4:45 p.m.  [facility]  8:00a.m-4:45 p.m. [facility]  [address]  [place], [place] [address] [phone] [facility]  [phone] Fax  Imaging [phone]    [facility] Labor and Delivery  [address] [place] [address]  [phone]    [facility]  [address]  [place] [address]  [phone] [facility]  [phone] Fax  Imaging [phone]     Urgent Care locations:    [facility] Monday-Friday  5 pm - 9 pm  Saturday and Sunday   9 am - 5 pm    Monday-Friday   11 am - 9 pm  Saturday and Sunday   9 am - 5 pm   [phone]       If you need a medication refill, please contact your pharmacy. Please allow 3 business days for your refill to be  completed.  As always, Thank you for trusting us with your healthcare needs!            Follow-ups after your visit        Who to contact     If you have questions or need follow up information about today's clinic visit or your schedule please contact [facility] directly at [phone].  Normal or non-critical lab and imaging results will be communicated to you by MyChart, letter or phone within 4 business days after the clinic has received the results. If you do not hear from us within 7 days, please contact the clinic through MyChart or phone. If you have a critical or abnormal lab result, we will notify you by phone as soon as possible.  Submit refill requests through [employer] or call your pharmacy and they will forward the refill request to us. Please allow 3 business days for your refill to be completed.          Additional Information About Your Visit        Care EveryWhere ID     This is your Care EveryWhere ID. This could be used by other organizations to access your [place] medical records  [id number]        Your Vitals Were     Pulse Temperature Last Period Breastfeeding? BMI (Body Mass Index)       85 98  F (36.7  C) (Oral) 03/03/2018 (Exact Date) No 30.86 kg/m2        Blood Pressure from Last 3 Encounters:   11/16/18 121/74   11/09/18 126/78   11/08/18 119/76    Weight from Last 3 Encounters:   11/16/18 158 lb (71.7 kg)   11/09/18 156 lb 12.8 oz (71.1 kg)   11/08/18 157 lb 6.4 oz (71.4 kg)              Today, you had the following     No orders found for display       Primary Care Provider Office Phone # Fax #    Brandan Miller [phone]       [address] [facility] [address]        Equal Access to Services     KRISTINA WHITMORE : Hadii paige Burton, waorenda ludajaadaha, qaybta kaalmada [place], yifan fowler. So [facility] [phone].    ATENCIÓN: Si habla español, tiene a fernandez disposición servicios gratuitos de  asistencia lingüística. Ekaterina al [phone].    We comply with applicable federal civil rights laws and [place] laws. We do not discriminate on the basis of race, color, national origin, age, disability, sex, sexual orientation, or gender identity.            Thank you!     Thank you for choosing [facility]  for your care. Our goal is always to provide you with excellent care. Hearing back from our patients is one way we can continue to improve our services. Please take a few minutes to complete the written survey that you may receive in the mail after your visit with us. Thank you!             Your Updated Medication List - Protect others around you: Learn how to safely use, store and throw away your medicines at www.disposemymeds.org.          This list is accurate as of 11/16/18  1:35 PM.  Always use your most recent med list.                   Brand Name Dispense Instructions for use Diagnosis    acetone (urine) test strip    KETOSTIX    20 each    1 strip by In Vitro route daily    Insulin controlled gestational diabetes mellitus (GDM) in second trimester       * blood glucose monitoring lancets     200 each    Use to test blood sugar 4 times daily or as directed.  Ok to substitute alternative if insurance prefers.    Gestational diabetes       * ONETOUCH DELICA LANCETS 33G Misc     360 each    360 each 4 times daily    Gestational diabetes       blood glucose monitoring meter device kit     1 kit    Use to test blood sugar 4 times daily or as directed.    Gestational diabetes mellitus (GDM), antepartum, gestational diabetes method of control unspecified       * blood glucose monitoring test strip    no brand specified    360 each    Use to test blood sugar 4  times daily or as directed.    Gestational diabetes       * ONETOUCH ULTRA test strip   Generic drug:  blood glucose monitoring     360 each    Use to test blood sugar 4  times daily or as directed.    Gestational diabetes        insulin pen needle 32G X 4 MM    BD BHANU U/F    100 each    Use 1 pen needles daily or as directed.        LEVEMIR FLEXPEN/FLEXTOUCH 100 UNIT/ML injection   Generic drug:  insulin detemir     21 mL    Inject 25 Units Subcutaneous At Bedtime    Insulin controlled gestational diabetes mellitus (GDM) during pregnancy, antepartum       order for DME     1 Device    Equipment being ordered: maternity support belt    Prenatal care, subsequent pregnancy, unspecified trimester, Back pain affecting pregnancy, antepartum       prenatal multivitamin plus iron 27-0.8 MG Tabs per tablet      Take 1 tablet by mouth daily        * Notice:  This list has 4 medication(s) that are the same as other medications prescribed for you. Read the directions carefully, and ask your doctor or other care provider to review them with you.

## 2018-11-16 NOTE — PATIENT INSTRUCTIONS
If you have any questions regarding your visit, Please contact your care team.     [employer]: [phone]    Women s Health CLINIC HOURS TELEPHONE NUMBER       NAVID Harris-      Prakash Mason-Medical Assistant       Monday-[place]  8:00a.m-4:45 p.m  Tuesday-[place]  9:00a.m-11:45 a.m  Wednesday-[place] 8:00a.m-4:45 p.m.  Thursday-[place]  8:00a.m-4:45 p.m.  Friday-[place]  8:00a.m-4:45 p.m. [facility]  [address] [place] [address]  [phone] ask [facility]  [phone] Fax  Imaging [phone]    [facility] Labor and Delivery  [address] [place] [address] [place] [address]  [phone]    [facility]  [address] [place] [address]  [phone] [facility]  [phone] Fax  Imaging [phone]     Urgent Care locations:    [place]        [place] Monday-Friday  5 pm - 9 pm  Saturday and Sunday   9 am - 5 pm    Monday-Friday   11 am - 9 pm  Saturday and Sunday   9 am - 5 pm   [phone]       If you need a medication refill, please contact your pharmacy. Please allow 3 business days for your refill to be completed.  As always, Thank you for trusting us with your healthcare needs!

## 2018-11-16 NOTE — TELEPHONE ENCOUNTER
..Reason for Call:   RACH    Detailed comments: Patient was seen today 11-, Patient has been experiencing fluid leakage.    *being seen today /[facility] 11-16/[facility]    Phone Number Patient can be reached at:   phone number:  [phone] or [phone]    Best Time: anytime    Can we leave a detailed message on this number? Not Applicable    Call taken on 11/16/2018 at 12:38 PM by Eliza Mejía          Seen today; experiencing leakage fluid x 2 weeks;

## 2018-11-16 NOTE — Clinical Note
Please arrange NST at [facility] on Wed for gest DM and umbilical cord varix. Please notify patient or her . May need  for patient.

## 2018-11-16 NOTE — TELEPHONE ENCOUNTER
Attempted to reach pt via  services, #[phone], to triage symptoms.   left message to call clinic back.    Vijaya Hauser RN

## 2018-11-17 NOTE — PROGRESS NOTES
No signif signs, symptoms or concerns except pelvic pressure. Had stable MFM follow-up today. Glc levels stable on insulin. Here with family and  interprets.   A non-stress test was performed to assess fetal well-being for the indication(s) noted and condition as described and this was reactive.   Advice appropriate to gestational age reviewed including pertinent Checklist items. Discussed condition, tests and care plan including RBA. Problem list updated. NST next- arrange to do on 11/21 at Labor and Delivery.   Anticipate induction at 36-37 weeks.   A/P:  Monserrat was seen today for prenatal care.    Diagnoses and all orders for this visit:    Pregnancy complicated by umbilical cord varix, antepartum, single or unspecified fetus  -     FETAL NON-STRESS TEST    Anemia affecting pregnancy, antepartum    Insulin controlled gestational diabetes mellitus (GDM) during pregnancy, antepartum  -     FETAL NON-STRESS TEST    Prenatal care, subsequent pregnancy, unspecified trimester        Saran Mccullough MD

## 2018-11-19 ENCOUNTER — TELEPHONE (OUTPATIENT)
Dept: OBGYN | Facility: CLINIC | Age: 38
End: 2018-11-19

## 2018-11-19 NOTE — TELEPHONE ENCOUNTER
Saran Mccullough MD  P  Ob/Gyn Ma/Lpn                   Please arrange NST at [facility] Labor and Delivery on Wed for gest DM and umbilical cord varix. Please notify patient or her . May need  for patient.       Called [facility] and set up for 11.21.18 at 10 am in L&D  [id number]- Mar[place]. Will close encounter since detailed message was left for patient     Celeste Kemp  Women's Health

## 2018-11-20 ENCOUNTER — TELEPHONE (OUTPATIENT)
Dept: OBGYN | Facility: CLINIC | Age: 38
End: 2018-11-20

## 2018-11-20 NOTE — TELEPHONE ENCOUNTER
Received phone call from pt and .  Pt asking if appointment scheduled for tomorrow is for the Induction.  Reiterate the following message   Saran Mccullough MD  P  Ob/Gyn Ma/Lpn                         Please arrange [facility] at [facility] on Wed for gest DM and umbilical cord varix. Please notify patient or her . May need  for patient.       Pt verbalized understanding. Samantha Man RN on 11/20/2018 at 2:43 PM

## 2018-11-20 NOTE — TELEPHONE ENCOUNTER
Patient was seen in clinic after this message was received.    Please disregard.    Isabella Garcia, CMA

## 2018-12-11 ENCOUNTER — TELEPHONE (OUTPATIENT)
Dept: OBGYN | Facility: CLINIC | Age: 38
End: 2018-12-11

## 2018-12-11 NOTE — TELEPHONE ENCOUNTER
..Reason for Call:   appointment    Detailed comments: told to come in for post partum check after 4     weeks (12-21) and the schedule does not allow; please advise;     Baby was born 11-    Phone Number Patient can be reached at: Cell number on file:    Telephone Information:   Mobile [phone]       Best Time: anytime    Can we leave a detailed message on this number? YES    Call taken on 12/11/2018 at 10:29 AM by Eliza Mejía

## 2021-08-01 ENCOUNTER — HEALTH MAINTENANCE LETTER (OUTPATIENT)
Age: 41
End: 2021-08-01

## 2021-09-26 ENCOUNTER — HEALTH MAINTENANCE LETTER (OUTPATIENT)
Age: 41
End: 2021-09-26

## 2022-02-17 PROBLEM — O09.529 ANTEPARTUM MULTIGRAVIDA OF ADVANCED MATERNAL AGE: Status: ACTIVE | Noted: 2018-05-17

## 2022-08-28 ENCOUNTER — HEALTH MAINTENANCE LETTER (OUTPATIENT)
Age: 42
End: 2022-08-28

## 2023-01-08 ENCOUNTER — HEALTH MAINTENANCE LETTER (OUTPATIENT)
Age: 43
End: 2023-01-08

## 2023-09-24 ENCOUNTER — HEALTH MAINTENANCE LETTER (OUTPATIENT)
Age: 43
End: 2023-09-24

## 2024-02-11 ENCOUNTER — HEALTH MAINTENANCE LETTER (OUTPATIENT)
Age: 44
End: 2024-02-11

## 2024-05-01 NOTE — TELEPHONE ENCOUNTER
Per Dr. Cheek, home sleep study shows CLAUDIO and patient is to keep appointment with sleep medicine on 5/23/24. Please arrange Level 2 u/s with MFM at [facility] site if possible. This is for AMA. I will sign the paper referral order later as needed.

## 2024-06-03 NOTE — TELEPHONE ENCOUNTER
Central Prior Authorization Team   Phone: [phone]      PA Initiation    Medication: LEVEMIR FLEXPEN/FLEXTOUCH 100 UNIT/ML soln-PA INITIATED  Insurance Company: [employer] - Phone [phone] Fax [phone]  Pharmacy Filling the Rx: [id number] - [place], [place] - [address]  Filling Pharmacy Phone: [phone]  Filling Pharmacy Fax:    Start Date: 10/16/2018         patient reports he took fentanyl a couple hours ago

## 2025-02-18 ENCOUNTER — OFFICE VISIT (OUTPATIENT)
Dept: INTERNAL MEDICINE | Facility: CLINIC | Age: 45
End: 2025-02-18
Payer: MEDICAID

## 2025-02-18 VITALS
SYSTOLIC BLOOD PRESSURE: 132 MMHG | TEMPERATURE: 97.1 F | DIASTOLIC BLOOD PRESSURE: 78 MMHG | OXYGEN SATURATION: 97 % | RESPIRATION RATE: 16 BRPM | HEART RATE: 113 BPM | BODY MASS INDEX: 30.98 KG/M2 | HEIGHT: 60 IN | WEIGHT: 157.8 LBS

## 2025-02-18 DIAGNOSIS — R10.32 BILATERAL LOWER ABDOMINAL PAIN: ICD-10-CM

## 2025-02-18 DIAGNOSIS — R41.3 MEMORY LOSS: ICD-10-CM

## 2025-02-18 DIAGNOSIS — J30.81 ALLERGIC RHINITIS DUE TO ANIMALS: ICD-10-CM

## 2025-02-18 DIAGNOSIS — M25.50 MULTIPLE JOINT PAIN: ICD-10-CM

## 2025-02-18 DIAGNOSIS — O24.414 INSULIN CONTROLLED GESTATIONAL DIABETES MELLITUS (GDM) DURING PREGNANCY, ANTEPARTUM: ICD-10-CM

## 2025-02-18 DIAGNOSIS — G93.0 ARACHNOID CYST OF POSTERIOR CRANIAL FOSSA: ICD-10-CM

## 2025-02-18 DIAGNOSIS — R30.0 DYSURIA: ICD-10-CM

## 2025-02-18 DIAGNOSIS — F32.A ANXIETY AND DEPRESSION: ICD-10-CM

## 2025-02-18 DIAGNOSIS — R10.31 BILATERAL LOWER ABDOMINAL PAIN: ICD-10-CM

## 2025-02-18 DIAGNOSIS — R47.89 WORD FINDING DIFFICULTY: ICD-10-CM

## 2025-02-18 DIAGNOSIS — F41.9 ANXIETY AND DEPRESSION: ICD-10-CM

## 2025-02-18 DIAGNOSIS — Z11.59 NEED FOR HEPATITIS C SCREENING TEST: ICD-10-CM

## 2025-02-18 DIAGNOSIS — Z13.29 SCREENING FOR ENDOCRINE DISORDER: ICD-10-CM

## 2025-02-18 DIAGNOSIS — Z13.6 SCREENING FOR CARDIOVASCULAR CONDITION: ICD-10-CM

## 2025-02-18 DIAGNOSIS — Z13.220 SCREENING FOR HYPERLIPIDEMIA: ICD-10-CM

## 2025-02-18 DIAGNOSIS — Z13.0 SCREENING, ANEMIA, DEFICIENCY, IRON: ICD-10-CM

## 2025-02-18 DIAGNOSIS — B37.31 YEAST INFECTION OF THE VAGINA: ICD-10-CM

## 2025-02-18 DIAGNOSIS — Z13.9 ENCOUNTER FOR SCREENING INVOLVING SOCIAL DETERMINANTS OF HEALTH (SDOH): ICD-10-CM

## 2025-02-18 DIAGNOSIS — Z12.4 CERVICAL CANCER SCREENING: ICD-10-CM

## 2025-02-18 DIAGNOSIS — Z00.00 ROUTINE GENERAL MEDICAL EXAMINATION AT A HEALTH CARE FACILITY: Primary | ICD-10-CM

## 2025-02-18 DIAGNOSIS — D50.9 IRON DEFICIENCY ANEMIA, UNSPECIFIED IRON DEFICIENCY ANEMIA TYPE: ICD-10-CM

## 2025-02-18 LAB
ALBUMIN SERPL BCG-MCNC: 4.3 G/DL (ref 3.5–5.2)
ALBUMIN UR-MCNC: NEGATIVE MG/DL
ALP SERPL-CCNC: 71 U/L (ref 40–150)
ALT SERPL W P-5'-P-CCNC: 41 U/L (ref 0–50)
ANION GAP SERPL CALCULATED.3IONS-SCNC: 12 MMOL/L (ref 7–15)
APPEARANCE UR: ABNORMAL
AST SERPL W P-5'-P-CCNC: 46 U/L (ref 0–45)
BASOPHILS # BLD AUTO: 0.1 10E3/UL (ref 0–0.2)
BASOPHILS NFR BLD AUTO: 1 %
BILIRUB SERPL-MCNC: 0.3 MG/DL
BILIRUB UR QL STRIP: NEGATIVE
BUN SERPL-MCNC: 9 MG/DL (ref 6–20)
CALCIUM SERPL-MCNC: 9.2 MG/DL (ref 8.8–10.4)
CHLORIDE SERPL-SCNC: 104 MMOL/L (ref 98–107)
CHOLEST SERPL-MCNC: 214 MG/DL
CLUE CELLS: ABNORMAL
COLOR UR AUTO: YELLOW
CREAT SERPL-MCNC: 0.5 MG/DL (ref 0.51–0.95)
EGFRCR SERPLBLD CKD-EPI 2021: >90 ML/MIN/1.73M2
EOSINOPHIL # BLD AUTO: 0.5 10E3/UL (ref 0–0.7)
EOSINOPHIL NFR BLD AUTO: 4 %
ERYTHROCYTE [DISTWIDTH] IN BLOOD BY AUTOMATED COUNT: 16.2 % (ref 10–15)
ERYTHROCYTE [SEDIMENTATION RATE] IN BLOOD BY WESTERGREN METHOD: 63 MM/HR (ref 0–20)
FASTING STATUS PATIENT QL REPORTED: YES
FASTING STATUS PATIENT QL REPORTED: YES
GLUCOSE SERPL-MCNC: 127 MG/DL (ref 70–99)
GLUCOSE UR STRIP-MCNC: NEGATIVE MG/DL
HCO3 SERPL-SCNC: 22 MMOL/L (ref 22–29)
HCT VFR BLD AUTO: 31 % (ref 35–47)
HCV AB SERPL QL IA: NONREACTIVE
HDLC SERPL-MCNC: 50 MG/DL
HGB BLD-MCNC: 9.4 G/DL (ref 11.7–15.7)
HGB UR QL STRIP: ABNORMAL
IMM GRANULOCYTES # BLD: 0 10E3/UL
IMM GRANULOCYTES NFR BLD: 0 %
KETONES UR STRIP-MCNC: NEGATIVE MG/DL
LDLC SERPL CALC-MCNC: 131 MG/DL
LEUKOCYTE ESTERASE UR QL STRIP: NEGATIVE
LYMPHOCYTES # BLD AUTO: 2.4 10E3/UL (ref 0.8–5.3)
LYMPHOCYTES NFR BLD AUTO: 21 %
MCH RBC QN AUTO: 22.6 PG (ref 26.5–33)
MCHC RBC AUTO-ENTMCNC: 30.3 G/DL (ref 31.5–36.5)
MCV RBC AUTO: 75 FL (ref 78–100)
MONOCYTES # BLD AUTO: 0.7 10E3/UL (ref 0–1.3)
MONOCYTES NFR BLD AUTO: 6 %
NEUTROPHILS # BLD AUTO: 7.4 10E3/UL (ref 1.6–8.3)
NEUTROPHILS NFR BLD AUTO: 67 %
NITRATE UR QL: NEGATIVE
NONHDLC SERPL-MCNC: 164 MG/DL
PH UR STRIP: 5.5 [PH] (ref 5–7)
PLATELET # BLD AUTO: 425 10E3/UL (ref 150–450)
POTASSIUM SERPL-SCNC: 4 MMOL/L (ref 3.4–5.3)
PROT SERPL-MCNC: 8 G/DL (ref 6.4–8.3)
RBC # BLD AUTO: 4.16 10E6/UL (ref 3.8–5.2)
RBC #/AREA URNS AUTO: ABNORMAL /HPF
RHEUMATOID FACT SERPL-ACNC: <10 IU/ML
SODIUM SERPL-SCNC: 138 MMOL/L (ref 135–145)
SP GR UR STRIP: >=1.03 (ref 1–1.03)
SQUAMOUS #/AREA URNS AUTO: ABNORMAL /LPF
TRICHOMONAS, WET PREP: ABNORMAL
TRIGL SERPL-MCNC: 167 MG/DL
TSH SERPL DL<=0.005 MIU/L-ACNC: 0.67 UIU/ML (ref 0.3–4.2)
UROBILINOGEN UR STRIP-ACNC: 0.2 E.U./DL
WBC # BLD AUTO: 11.1 10E3/UL (ref 4–11)
WBC #/AREA URNS AUTO: ABNORMAL /HPF
WBC'S/HIGH POWER FIELD, WET PREP: ABNORMAL
YEAST, WET PREP: PRESENT

## 2025-02-18 PROCEDURE — 80053 COMPREHEN METABOLIC PANEL: CPT

## 2025-02-18 PROCEDURE — 86038 ANTINUCLEAR ANTIBODIES: CPT

## 2025-02-18 PROCEDURE — 86431 RHEUMATOID FACTOR QUANT: CPT

## 2025-02-18 PROCEDURE — 85025 COMPLETE CBC W/AUTO DIFF WBC: CPT

## 2025-02-18 PROCEDURE — 99214 OFFICE O/P EST MOD 30 MIN: CPT | Mod: 25

## 2025-02-18 PROCEDURE — 80061 LIPID PANEL: CPT

## 2025-02-18 PROCEDURE — 81001 URINALYSIS AUTO W/SCOPE: CPT

## 2025-02-18 PROCEDURE — 85652 RBC SED RATE AUTOMATED: CPT

## 2025-02-18 PROCEDURE — 99386 PREV VISIT NEW AGE 40-64: CPT

## 2025-02-18 PROCEDURE — 36415 COLL VENOUS BLD VENIPUNCTURE: CPT

## 2025-02-18 PROCEDURE — 84443 ASSAY THYROID STIM HORMONE: CPT

## 2025-02-18 PROCEDURE — 86039 ANTINUCLEAR ANTIBODIES (ANA): CPT

## 2025-02-18 PROCEDURE — 87210 SMEAR WET MOUNT SALINE/INK: CPT

## 2025-02-18 PROCEDURE — 86803 HEPATITIS C AB TEST: CPT

## 2025-02-18 RX ORDER — FLUCONAZOLE 150 MG/1
150 TABLET ORAL ONCE
Qty: 1 TABLET | Refills: 0 | Status: SHIPPED | OUTPATIENT
Start: 2025-02-18 | End: 2025-02-18

## 2025-02-18 RX ORDER — AZELASTINE 1 MG/ML
1 SPRAY, METERED NASAL 2 TIMES DAILY
Qty: 30 ML | Refills: 3 | Status: SHIPPED | OUTPATIENT
Start: 2025-02-18

## 2025-02-18 SDOH — HEALTH STABILITY: PHYSICAL HEALTH: ON AVERAGE, HOW MANY DAYS PER WEEK DO YOU ENGAGE IN MODERATE TO STRENUOUS EXERCISE (LIKE A BRISK WALK)?: 1 DAY

## 2025-02-18 ASSESSMENT — SOCIAL DETERMINANTS OF HEALTH (SDOH): HOW OFTEN DO YOU GET TOGETHER WITH FRIENDS OR RELATIVES?: ONCE A WEEK

## 2025-02-18 ASSESSMENT — PATIENT HEALTH QUESTIONNAIRE - PHQ9
SUM OF ALL RESPONSES TO PHQ QUESTIONS 1-9: 20
SUM OF ALL RESPONSES TO PHQ QUESTIONS 1-9: 20
10. IF YOU CHECKED OFF ANY PROBLEMS, HOW DIFFICULT HAVE THESE PROBLEMS MADE IT FOR YOU TO DO YOUR WORK, TAKE CARE OF THINGS AT HOME, OR GET ALONG WITH OTHER PEOPLE: VERY DIFFICULT

## 2025-02-18 ASSESSMENT — COLUMBIA-SUICIDE SEVERITY RATING SCALE - C-SSRS
2. IN THE PAST MONTH, HAVE YOU ACTUALLY HAD ANY THOUGHTS OF KILLING YOURSELF?: NO
1. WITHIN THE PAST MONTH, HAVE YOU WISHED YOU WERE DEAD OR WISHED YOU COULD GO TO SLEEP AND NOT WAKE UP?: NO
6. HAVE YOU EVER DONE ANYTHING, STARTED TO DO ANYTHING, OR PREPARED TO DO ANYTHING TO END YOUR LIFE?: NO

## 2025-02-18 NOTE — PATIENT INSTRUCTIONS
"Patient Education   Consejos de atención preventiva  Se trata de consejos generales que solemos ofrecer para ayudar a las personas a mantenerse saludables. Fernandez equipo de atención puede darle consejos específicos. Hable con ellos sobre rebeca propias necesidades de atención preventiva.  Estilo de chuck  Ejercítese, hugo mínimo, 150 minutos a la semana (30 minutos al día, 5 días a la semana).  Realice actividades de fortalecimiento muscular 2 días a la semana, ya que contribuyen al control del peso y a la prevención de enfermedades.  No fume.  Use protector solar para prevenir el cáncer de piel.  Cada 2 a 5 años, realice pruebas de detección de radón en fernandez hogar. Se trata de un gas incoloro e inodoro que puede dañar los pulmones. Para obtener más información, visite [email or web address] y busque \"Radon in Homes\" (Radón en los hogares).  Mantenga las catherine descargadas y bajo llave en un lugar seguro, hugo sushil caja chung o sushil cámara blindada, o use un candado para catherine y esconda las llaves. Guarde siempre las balas por separado. Para obtener más información, visite [email or web address] y busque \"Safe Gun Storage\" (Almacenamiento seguro too).  Alimentación  Consuma 5 o más porciones de frutas y verduras al día.  Pruebe el pan de sudhakar, el arroz integral y la pasta integral (en lugar de pan novoa, arroz novoa y pasta).  Consuma suficiente calcio y vitamina D. Revise la etiqueta de los alimentos y procure alcanzar el 100 % de la ingesta diaria recomendada (IDR).  Exámenes periódicos  Hágase un examen dental y sushil limpieza cada 6 meses.  Visite a fernandez equipo de atención médica todos los años para hablar sobre lo siguiente:  Todo cambio en fernandez jesus.  Todo medicamento que fernandez equipo de atención le haya recetado.  Atención preventiva, planificación familiar y formas de prevenir enfermedades crónicas.  Inyecciones (vacunas)   Vacunas contra el virus del papiloma humano (VPH) (hasta los 26 años), si nunca se las arredondo colocado.  Vacunas " contra la hepatitis B (hasta los 59 años), si nunca se las arredondo colocado.  Vacuna contra la COVID-19: vacúnese cuando corresponda.  Vacuna contra la gripe: vacúnese contra la gripe todos los años.  Vacuna contra el tétanos: vacúnese contra el tétanos cada 10 años.  Vacunas contra el neumococo, la hepatitis A y el virus sincicial respiratorio (VSR): pregunte a fernandez equipo de atención si las necesita en función de fernandez riesgo.  Vacuna contra el herpes zóster (para personas de 50 años o más).  Pruebas médicas generales  Examen de detección de diabetes:  A partir de los 35 años, hágase exámenes de detección de diabetes, hugo mínimo, cada 3 años.  Si tiene menos de 35 años, pregunte a fernandez equipo de atención si debe hacerlo.  Prueba de colesterol: a los 39 años, comience a hacerse sushil prueba de colesterol cada 5 años o con mayor frecuencia si se lo aconsejan.  Exploración de densidad ósea (DEXA): a los 50 años, pregunte a fernandez equipo de atención si debe hacerse esta exploración para detectar osteoporosis (fragilidad en los huesos).  Hepatitis C: hágase la prueba, hugo mínimo, sushil vez en la chuck.  Examen de detección de aneurismas aórticos abdominales: hable con fernandez médico sobre la posibilidad de hacerse gonzalo examen de detección si cumple alguna de las siguientes condiciones:  fumó alguna vez;  y  es hombre según fernandez sexo biológico;  y  tiene entre 65 y 75 años.  Infecciones de transmisión sexual (ITS)  Antes de los 24 años, pregunte a fernandez equipo de atención si debe hacerse exámenes de detección de ITS.  Después de los 24 años, hágase exámenes de detección de ITS si está en riesgo. Está en riesgo de contraer alguna ITS (incluido el virus de la inmunodeficiencia adquirida [VIH]) en los siguientes casos:  Tiene relaciones sexuales con más de sushil persona.  No usa preservativos siempre que tiene relaciones sexuales.  A usted o a fernandez emi le diagnosticaron sushil infección de transmisión sexual.  Si está en riesgo de contraer el VIH,  consulte sobre los medicamentos de la profilaxis de preexposición (Pre-Exposure Prophylaxis, PrEP) para prevenirlo.  Hágase la prueba del VIH, hugo mínimo, sushil vez en la chuck, tanto si está en riesgo de contraer el virus hugo si no.  Pruebas de detección de cáncer  Examen de detección de cáncer de reina uterino: si tiene reina uterino, comience a hacerse pruebas periódicas de detección de cáncer de reina uterino a los 21 años. La mayoría de las personas que se hacen exámenes periódicos de detección y obtienen resultados normales pueden dejar de hacerlo a partir de los 65 años. Hable sobre esto con fernandez proveedor.  Exploración de detección de cáncer de mama (mamografía): si alguna vez ha tenido mamas, comience a hacerse mamografías periódicas a partir de los 40 años. Se trata de sushil exploración para detectar el cáncer de mama.  Examen de detección de cáncer de colon: es importante comenzar a hacerse los exámenes de detección de cáncer de colon a los 45 años.  Hágase sushil colonoscopía cada 10 años (o con más frecuencia si está en riesgo) O pregunte a fernandez proveedor sobre pruebas de heces, hugo la prueba inmunoquímica fecal (Fecal Immunochemical Test, FIT) cada año o la prueba Cologuard cada 3 años.  Para obtener más información sobre las opciones de las pruebas que tiene a disposición, visite: [email or web address].  Si necesita ayuda para vicki sushil decisión, visite: [email or web address].  Prueba de detección de cáncer de próstata: si tiene próstata y está entre los 55 y 69 años, pregunte a fernandez proveedor si le convendría hacerse sushil prueba anual de detección de cáncer de próstata.  Examen de detección de cáncer de pulmón: si fuma o fumó y tiene entre 50 y 80 años, pregunte a fernandez equipo de atención si los exámenes continuos de detección de cáncer de pulmón son adecuados para usted.    Solo para uso con fines informativos. Ayesha documento no pretende sustituir ninguna recomendación médica. Derechos de autor   2023 [facility].   Todos los derechos [facility]. Revisión clínica a cargo de  [facility] [place] [facility]. [place] [id number] - REV 04/24.  Learning About Stress  What is stress?     Stress is your body's response to a hard situation. Your body can have a physical, emotional, or mental response. Stress is a fact of life for most people, and it affects everyone differently. What causes stress for you may not be stressful for someone else.  A lot of things can cause stress. You may feel stress when you go on a job interview, take a test, or run a race. This kind of short-term stress is normal and even useful. It can help you if you need to work hard or react quickly. For example, stress can help you finish an important job on time.  Long-term stress is caused by ongoing stressful situations or events. Examples of long-term stress include long-term health problems, ongoing problems at work, or conflicts in your family. Long-term stress can harm your health.  How does stress affect your health?  When you are stressed, your body responds as though you are in danger. It makes hormones that speed up your heart, make you breathe faster, and give you a burst of energy. This is called the fight-or-flight stress response. If the stress is over quickly, your body goes back to normal and no harm is done.  But if stress happens too often or lasts too long, it can have bad effects. Long-term stress can make you more likely to get sick, and it can make symptoms of some diseases worse. If you tense up when you are stressed, you may develop neck, shoulder, or low back pain. Stress is linked to high blood pressure and heart disease.  Stress also harms your emotional health. It can make you marcelo, tense, or depressed. Your relationships may suffer, and you may not do well at work or school.  What can you do to manage stress?  You can try these things to help manage stress:   Do something active. Exercise or activity can help  reduce stress. Walking is a great way to get started. Even everyday activities such as housecleaning or yard work can help.  Try yoga or kamini chi. These techniques combine exercise and meditation. You may need some training at first to learn them.  Do something you enjoy. For example, listen to music or go to a movie. Practice your hobby or do volunteer work.  Meditate. This can help you relax, because you are not worrying about what happened before or what may happen in the future.  Do guided imagery. Imagine yourself in any setting that helps you feel calm. You can use online videos, books, or a teacher to guide you.  Do breathing exercises. For example:  From a standing position, bend forward from the waist with your knees slightly bent. Let your arms dangle close to the floor.  Breathe in slowly and deeply as you return to a standing position. Roll up slowly and lift your head last.  Hold your breath for just a few seconds in the standing position.  Breathe out slowly and bend forward from the waist.  Let your feelings out. Talk, laugh, cry, and express anger when you need to. Talking with supportive friends or family, a counselor, or a ananya leader about your feelings is a healthy way to relieve stress. Avoid discussing your feelings with people who make you feel worse.  Write. It may help to write about things that are bothering you. This helps you find out how much stress you feel and what is causing it. When you know this, you can find better ways to cope.  What can you do to prevent stress?  You might try some of these things to help prevent stress:  Manage your time. This helps you find time to do the things you want and need to do.  Get enough sleep. Your body recovers from the stresses of the day while you are sleeping.  Get support. Your family, friends, and community can make a difference in how you experience stress.  Limit your news feed. Avoid or limit time on social media or news that may make you  "feel stressed.  Do something active. Exercise or activity can help reduce stress. Walking is a great way to get started.  Where can you learn more?  Go to https://www.[employer].net/patiented  Enter N032 in the search box to learn more about \"Learning About Stress.\"  Current as of: October 24, 2023  Content Version: 14.3    2024 [employer].   Care instructions adapted under license by your healthcare professional. If you have questions about a medical condition or this instruction, always ask your healthcare professional. [employer] disclaims any warranty or liability for your use of this information.    Learning About Depression Screening  What is depression screening?  Depression screening is a way to see if you have depression symptoms. It may be done by a doctor or counselor. It's often part of a routine checkup. That's because your mental health is just as important as your physical health.  Depression is a mental health condition that affects how you feel, think, and act. You may:  Have less energy.  Lose interest in your daily activities.  Feel sad and grouchy for a long time.  Depression is very common. It affects people of all ages.  Many things can lead to depression. Some people become depressed after they have a stroke or find out they have a major illness like cancer or heart disease. The death of a loved one or a breakup may lead to depression. It can run in families. Most experts believe that a combination of inherited genes and stressful life events can cause it.  What happens during screening?  You may be asked to fill out a form about your depression symptoms. You and the doctor will discuss your answers. The doctor may ask you more questions to learn more about how you think, act, and feel.  What happens after screening?  If you have symptoms of depression, your doctor will talk to you about your options.  Doctors usually treat depression with medicines or counseling. " "Often, combining the two works best. Many people don't get help because they think that they'll get over the depression on their own. But people with depression may not get better unless they get treatment.  The cause of depression is not well understood. There may be many factors involved. But if you have depression, it's not your fault.  A serious symptom of depression is thinking about death or suicide. If you or someone you care about talks about this or about feeling hopeless, get help right away.  It's important to know that depression can be treated. Medicine, counseling, and self-care may help.  Where can you learn more?  Go to https://www.[employer].net/patiented  Enter T185 in the search box to learn more about \"Learning About Depression Screening.\"  Current as of: July 31, 2024  Content Version: 14.3    2024 [employer].   Care instructions adapted under license by your healthcare professional. If you have questions about a medical condition or this instruction, always ask your healthcare professional. [employer] disclaims any warranty or liability for your use of this information.       Vaginal Yeast Infection: Care Instructions  Overview     A vaginal yeast infection is the growth of too many yeast cells in the vagina. This is a common problem. Itching, vaginal discharge and irritation, and other symptoms can bother you. But yeast infections don't often cause other health problems.  Some medicines can increase your risk of getting a yeast infection. These include antibiotics, hormones, and steroids. You may also be more likely to get a yeast infection if you are pregnant, have diabetes, douche, or wear tight clothes.  With treatment, most yeast infections get better in a few days.  Follow-up care is a key part of your treatment and safety. Be sure to make and go to all appointments, and call your doctor if you are having problems. It's also a good idea to know your test results " "and keep a list of the medicines you take.  How can you care for yourself at home?  Take your medicines exactly as prescribed. Call your doctor if you think you are having a problem with your medicine.  Ask your doctor about over-the-counter (OTC) medicines for yeast infections. If you use an OTC treatment, read and follow all instructions on the label.  Don't use tampons while using a vaginal cream or suppository. The tampons can absorb the medicine. Use pads instead.  Wear loose cotton clothing. Don't wear nylon or other fabric that holds body heat and moisture close to the skin.  Try sleeping without underwear.  Don't scratch. Relieve itching with a cold pack or a cool bath.  Don't wash your vulva more than once a day. Use plain water or a mild, unscented soap. Air-dry the vulva.  Change out of wet or damp clothes as soon as possible.  If you are using a vaginal medicine, don't have sex until you have finished your treatment. But if you do have sex, don't depend on a condom or diaphragm for birth control. The oil in some vaginal medicines weakens latex.  Don't douche or use powders, sprays, or perfumes in your vagina or on your vulva. These items can change the normal balance of organisms in your vagina.  When should you call for help?   Call your doctor now or seek immediate medical care if:    You have new or increased pain in your vagina or pelvis.   Watch closely for changes in your health, and be sure to contact your doctor if:    You have unexpected vaginal bleeding.     You have a fever.     You are not getting better after 2 days.     Your symptoms come back after you finish your medicines.   Where can you learn more?  Go to https://www.[employer].net/patiented  Enter F639 in the search box to learn more about \"Vaginal Yeast Infection: Care Instructions.\"  Current as of: April 30, 2024  Content Version: 14.3    2024 [employer].   Care instructions adapted under license by [facility] " "professional. If you have questions about a medical condition or this instruction, always ask your healthcare professional. [employer] disclaims any warranty or liability for your use of this information.    Patient Education   Consejos de atención preventiva  Se trata de consejos generales que solemos ofrecer para ayudar a las personas a mantenerse saludables. Fernandez equipo de atención puede darle consejos específicos. Hable con ellos sobre rebeca propias necesidades de atención preventiva.  Estilo de chuck  Ejercítese, hugo mínimo, 150 minutos a la semana (30 minutos al día, 5 días a la semana).  Realice actividades de fortalecimiento muscular 2 días a la semana, ya que contribuyen al control del peso y a la prevención de enfermedades.  No fume.  Use protector solar para prevenir el cáncer de piel.  Cada 2 a 5 años, realice pruebas de detección de radón en fernandez hogar. Se trata de un gas incoloro e inodoro que puede dañar los pulmones. Para obtener más información, visite www.health.[facility].mn.us y busque \"Radon in Homes\" (Radón en los hogares).  Mantenga las catherine descargadas y bajo llave en un lugar seguro, hugo sushil caja chung o sushil cámara blindada, o use un candado para catherine y esconda las llaves. Guarde siempre las balas por separado. Para obtener más información, visite [employer].[place].gov y busque \"Safe Gun Storage\" (Almacenamiento seguro too).  Alimentación  Consuma 5 o más porciones de frutas y verduras al día.  Pruebe el pan de sudhakar, el arroz integral y la pasta integral (en lugar de pan novoa, arroz novoa y pasta).  Consuma suficiente calcio y vitamina D. Revise la etiqueta de los alimentos y procure alcanzar el 100 % de la ingesta diaria recomendada (IDR).  Exámenes periódicos  Hágase un examen dental y sushil limpieza cada 6 meses.  Visite a fernandez equipo de atención médica todos los años para hablar sobre lo siguiente:  Todo cambio en fernandez jesus.  Todo medicamento que fernandez equipo de atención le haya " recetado.  Atención preventiva, planificación familiar y formas de prevenir enfermedades crónicas.  Inyecciones (vacunas)   Vacunas contra el virus del papiloma humano (VPH) (hasta los 26 años), si nunca se las arredondo colocado.  Vacunas contra la hepatitis B (hasta los 59 años), si nunca se las arredondo colocado.  Vacuna contra la COVID-19: vacúnese cuando corresponda.  Vacuna contra la gripe: vacúnese contra la gripe todos los años.  Vacuna contra el tétanos: vacúnese contra el tétanos cada 10 años.  Vacunas contra el neumococo, la hepatitis A y el virus sincicial respiratorio (VSR): pregunte a fernandez equipo de atención si las necesita en función de fernandez riesgo.  Vacuna contra el herpes zóster (para personas de 50 años o más).  Pruebas médicas generales  Examen de detección de diabetes:  A partir de los 35 años, hágase exámenes de detección de diabetes, hugo mínimo, cada 3 años.  Si tiene menos de 35 años, pregunte a fernandez equipo de atención si debe hacerlo.  Prueba de colesterol: a los 39 años, comience a hacerse sushil prueba de colesterol cada 5 años o con mayor frecuencia si se lo aconsejan.  Exploración de densidad ósea (DEXA): a los 50 años, pregunte a fernandez equipo de atención si debe hacerse esta exploración para detectar osteoporosis (fragilidad en los huesos).  Hepatitis C: hágase la prueba, hugo mínimo, sushil vez en la chuck.  Examen de detección de aneurismas aórticos abdominales: hable con fernandez médico sobre la posibilidad de hacerse gonzalo examen de detección si cumple alguna de las siguientes condiciones:  fumó alguna vez;  y  es hombre según fernandez sexo biológico;  y  tiene entre 65 y 75 años.  Infecciones de transmisión sexual (ITS)  Antes de los 24 años, pregunte a fernandez equipo de atención si debe hacerse exámenes de detección de ITS.  Después de los 24 años, hágase exámenes de detección de ITS si está en riesgo. Está en riesgo de contraer alguna ITS (incluido el virus de la inmunodeficiencia adquirida [VIH]) en los siguientes  casos:  Tiene relaciones sexuales con más de sushil persona.  No usa preservativos siempre que tiene relaciones sexuales.  A usted o a fernandez emi le diagnosticaron sushil infección de transmisión sexual.  Si está en riesgo de contraer el VIH, consulte sobre los medicamentos de la profilaxis de preexposición (Pre-Exposure Prophylaxis, PrEP) para prevenirlo.  Hágase la prueba del VIH, hugo mínimo, sushil vez en la chuck, tanto si está en riesgo de contraer el virus hugo si no.  Pruebas de detección de cáncer  Examen de detección de cáncer de reina uterino: si tiene reina uterino, comience a hacerse pruebas periódicas de detección de cáncer de reina uterino a los 21 años. La mayoría de las personas que se hacen exámenes periódicos de detección y obtienen resultados normales pueden dejar de hacerlo a partir de los 65 años. Hable sobre esto con fernandez proveedor.  Exploración de detección de cáncer de mama (mamografía): si alguna vez ha tenido mamas, comience a hacerse mamografías periódicas a partir de los 40 años. Se trata de sushil exploración para detectar el cáncer de mama.  Examen de detección de cáncer de colon: es importante comenzar a hacerse los exámenes de detección de cáncer de colon a los 45 años.  Hágase sushil colonoscopía cada 10 años (o con más frecuencia si está en riesgo) O pregunte a fernandez proveedor sobre pruebas de heces, hugo la prueba inmunoquímica fecal (Fecal Immunochemical Test, FIT) cada año o la prueba Cologuard cada 3 años.  Para obtener más información sobre las opciones de las pruebas que tiene a disposición, visite: [email or web address] ([email or web address].  Si necesita ayuda para vicki sushil decisión, visite: [id number].  Prueba de detección de cáncer de próstata: si tiene próstata y está entre los 55 y 69 años, pregunte a fernandez proveedor si le convendría hacerse sushil prueba anual de detección de cáncer de próstata.  Examen de detección de cáncer de pulmón: si fuma o fumó y tiene entre 50 y 80 años, pregunte a fernandez equipo de  atención si los exámenes continuos de detección de cáncer de pulmón son adecuados para usted.    Solo para uso con fines informativos. Ayesha documento no pretende sustituir ninguna recomendación médica. Derechos de autor   2023 [employer].   Todos los derechos reservados. Revisión clínica a cargo de  [facility] [place] [facility]. [employer] [id number] - REV 04/24.  Learning About Stress  What is stress?     Stress is your body's response to a hard situation. Your body can have a physical, emotional, or mental response. Stress is a fact of life for most people, and it affects everyone differently. What causes stress for you may not be stressful for someone else.  A lot of things can cause stress. You may feel stress when you go on a job interview, take a test, or run a race. This kind of short-term stress is normal and even useful. It can help you if you need to work hard or react quickly. For example, stress can help you finish an important job on time.  Long-term stress is caused by ongoing stressful situations or events. Examples of long-term stress include long-term health problems, ongoing problems at work, or conflicts in your family. Long-term stress can harm your health.  How does stress affect your health?  When you are stressed, your body responds as though you are in danger. It makes hormones that speed up your heart, make you breathe faster, and give you a burst of energy. This is called the fight-or-flight stress response. If the stress is over quickly, your body goes back to normal and no harm is done.  But if stress happens too often or lasts too long, it can have bad effects. Long-term stress can make you more likely to get sick, and it can make symptoms of some diseases worse. If you tense up when you are stressed, you may develop neck, shoulder, or low back pain. Stress is linked to high blood pressure and heart disease.  Stress also harms your emotional health. It can make you  marcelo, tense, or depressed. Your relationships may suffer, and you may not do well at work or school.  What can you do to manage stress?  You can try these things to help manage stress:   Do something active. Exercise or activity can help reduce stress. Walking is a great way to get started. Even everyday activities such as housecleaning or yard work can help.  Try yoga or kamini chi. These techniques combine exercise and meditation. You may need some training at first to learn them.  Do something you enjoy. For example, listen to music or go to a movie. Practice your hobby or do volunteer work.  Meditate. This can help you relax, because you are not worrying about what happened before or what may happen in the future.  Do guided imagery. Imagine yourself in any setting that helps you feel calm. You can use online videos, books, or a teacher to guide you.  Do breathing exercises. For example:  From a standing position, bend forward from the waist with your knees slightly bent. Let your arms dangle close to the floor.  Breathe in slowly and deeply as you return to a standing position. Roll up slowly and lift your head last.  Hold your breath for just a few seconds in the standing position.  Breathe out slowly and bend forward from the waist.  Let your feelings out. Talk, laugh, cry, and express anger when you need to. Talking with supportive friends or family, a counselor, or a ananya leader about your feelings is a healthy way to relieve stress. Avoid discussing your feelings with people who make you feel worse.  Write. It may help to write about things that are bothering you. This helps you find out how much stress you feel and what is causing it. When you know this, you can find better ways to cope.  What can you do to prevent stress?  You might try some of these things to help prevent stress:  Manage your time. This helps you find time to do the things you want and need to do.  Get enough sleep. Your body recovers  "from the stresses of the day while you are sleeping.  Get support. Your family, friends, and community can make a difference in how you experience stress.  Limit your news feed. Avoid or limit time on social media or news that may make you feel stressed.  Do something active. Exercise or activity can help reduce stress. Walking is a great way to get started.  Where can you learn more?  Go to https://www.[employer].net/patiented  Enter N032 in the search box to learn more about \"Learning About Stress.\"  Current as of: October 24, 2023  Content Version: 14.3    2024 [employer].   Care instructions adapted under license by your healthcare professional. If you have questions about a medical condition or this instruction, always ask your healthcare professional. [employer] disclaims any warranty or liability for your use of this information.    Learning About Depression Screening  What is depression screening?  Depression screening is a way to see if you have depression symptoms. It may be done by a doctor or counselor. It's often part of a routine checkup. That's because your mental health is just as important as your physical health.  Depression is a mental health condition that affects how you feel, think, and act. You may:  Have less energy.  Lose interest in your daily activities.  Feel sad and grouchy for a long time.  Depression is very common. It affects people of all ages.  Many things can lead to depression. Some people become depressed after they have a stroke or find out they have a major illness like cancer or heart disease. The death of a loved one or a breakup may lead to depression. It can run in families. Most experts believe that a combination of inherited genes and stressful life events can cause it.  What happens during screening?  You may be asked to fill out a form about your depression symptoms. You and the doctor will discuss your answers. The doctor may ask you more " "questions to learn more about how you think, act, and feel.  What happens after screening?  If you have symptoms of depression, your doctor will talk to you about your options.  Doctors usually treat depression with medicines or counseling. Often, combining the two works best. Many people don't get help because they think that they'll get over the depression on their own. But people with depression may not get better unless they get treatment.  The cause of depression is not well understood. There may be many factors involved. But if you have depression, it's not your fault.  A serious symptom of depression is thinking about death or suicide. If you or someone you care about talks about this or about feeling hopeless, get help right away.  It's important to know that depression can be treated. Medicine, counseling, and self-care may help.  Where can you learn more?  Go to https://www.[employer].net/patiented  Enter T185 in the search box to learn more about \"Learning About Depression Screening.\"  Current as of: July 31, 2024  Content Version: 14.3    2024 [employer].   Care instructions adapted under license by your healthcare professional. If you have questions about a medical condition or this instruction, always ask your healthcare professional. [employer] disclaims any warranty or liability for your use of this information.       "

## 2025-02-18 NOTE — PROGRESS NOTES
Preventive Care Visit  [facility] PREM Lora CNP, Internal Medicine  Feb 18, 2025      Assessment & Plan     (Z00.00) Routine general medical examination at a health care facility  (primary encounter diagnosis)  Comment: Patient seen in clinic today for preventative health exam and to establish care. Discussed labs, screenings , need for referrals and anxiety and depression medications.     (Z13.9) Encounter for screening involving social determinants of health (SDoH)  Comment: Discussed need for referral.   Plan: Primary Care - Care Coordination Referral        Future     (Z12.4) Cervical cancer screening  Comment: Discussed recommendation to screen.   Plan: HPV and Gynecologic Cytology Panel -         Recommended Age 30 - 65 Years        Pending     (Z11.59) Need for hepatitis C screening test  Comment: Discussed recommendation to screen.   Plan: Hepatitis C Screen Reflex to HCV RNA Quant and         Genotype        Pending     (F41.9,  F32.A) Anxiety and depression  Comment: Patient has been dealing with ongoing anxiety and depression since mothers passing. Discussed need for mental health referral for talk therapy also provided information on medications that can help with symptoms management.   Plan: Adult Mental Health  Referral        Future.   Patient will update if wanting to start medication. Discussed could do virtually.     (J30.81) Allergic rhinitis due to animal  Comment: Chronic. Patient has been feeling very tired with oral antihistamines and wanted a nasal spry option discussed azelastine spray. Patient acknowledged and agreed to plan of care.   Plan: azelastine (ASTELIN) 0.1 % nasal spray        Prescription sent to pharmacy    (M25.50) Multiple joint pain  Comment: Patient has been dealing with joint pain. Discussed labs important to today's visit. Depending on results may need rheumatology referral.   Plan: Anti Nuclear Margot IgG by IFA with Reflex,          Rheumatoid factor, ESR: Erythrocyte         sedimentation rate        Pending     (Z13.0) Screening, anemia, deficiency, iron  Comment: Discussed recommendation to screen.   Plan: CBC with platelets and differential        Noted patient is iron deficient needs to start on iron supplementation.     (D50.9) Iron deficiency anemia, unspecified iron deficiency anemia type  Comment: Patient CBC reflected iron deficiency.  Plan: Elemental iron 65 mg Vitamin C 125 mg (VITRON         C)  MG TABS tablet        Prescription sent to pharmacy     (Z13.6) Screening for cardiovascular condition  Comment: Discussed recommendation to screen.   Plan: Comprehensive metabolic panel (BMP + Alb, Alk         Phos, ALT, AST, Total. Bili, TP)        Pending     (Z13.29) Screening for endocrine disorder  Comment: Discussed recommendation to screen.   Plan: TSH with free T4 reflex        Pending     (R30.0) Dysuria  Comment: Discussed wet prep and checking for UTI.   Plan: Wet prep - Clinic Collect, UA Macroscopic with         reflex to Microscopic and Culture - Lab         Collect, UA Microscopic with Reflex to Culture        WNL     (B37.31) Yeast infection of the vagina  Comment: Patient positive for yeast infection.   Plan: fluconazole (DIFLUCAN) 150 MG tablet        Prescription sent to pharmacy    (R10.31,  R10.32) Bilateral lower abdominal pain  Comment: Patient has been having on going lower abdominal pain as well as vaginal pain. Has family history of uterin and cervical cancer   Plan: US Pelvic Complete with Transvaginal        Future     (G93.0) Arachnoid cyst of posterior cranial fossa  Comment: Found in 2012. Patient requesting referral to neurology due to issues with memory and word finding, concerned it is related to cyst. Discussed referral to neurology to further assess.   Plan: Adult Neurology  Referral        Future     (R41.3) Memory loss  Comment: Patient requesting referral to neurology due to issues  "with memory and word finding, concerned it is related to cyst. Discussed referral to neurology to further assess.   Plan: Adult Neurology  Referral        Future     (R47.89) Word finding difficulty  Comment: Patient requesting referral to neurology due to issues with memory and word finding, concerned it is related to cyst. Discussed referral to neurology to further assess.   Plan: Adult Neurology  Referral        Future   (Z13.220) Screening for hyperlipidemia  Comment: Discussed recommendation to screen.   Plan: Lipid panel reflex to direct LDL Fasting        Pending       Patient has been advised of split billing requirements and indicates understanding: Yes        BMI  Estimated body mass index is 30.34 kg/m  as calculated from the following:    Height as of this encounter: 1.536 m (5' 0.47\").    Weight as of this encounter: 71.6 kg (157 lb 12.8 oz).       Depression Screening Follow Up        2/18/2025     8:38 AM   PHQ   PHQ-9 Total Score 20    Q9: Thoughts of better off dead/self-harm past 2 weeks Several days   F/U: Thoughts of suicide or self-harm No   F/U: Safety concerns No       Patient-reported         2/18/2025     8:38 AM   Last PHQ-9   1.  Little interest or pleasure in doing things 2   2.  Feeling down, depressed, or hopeless 2   3.  Trouble falling or staying asleep, or sleeping too much 3   4.  Feeling tired or having little energy 2   5.  Poor appetite or overeating 3   6.  Feeling bad about yourself 3   7.  Trouble concentrating 2   8.  Moving slowly or restless 2   Q9: Thoughts of better off dead/self-harm past 2 weeks 1   PHQ-9 Total Score 20    In the past two weeks have you had thoughts of suicide or self harm? No   Do you have concerns about your personal safety or the safety of others? No       Patient-reported                No data to display                    Follow Up Actions Taken  Crisis resource information provided in the After Visit Summary  Mental Health " Referral placed    Discussed the following ways the patient can remain in a safe environment:   Patient feels environment is safe.   Counseling  Appropriate preventive services were addressed with this patient via screening, questionnaire, or discussion as appropriate for fall prevention, nutrition, physical activity, Tobacco-use cessation, social engagement, weight loss and cognition.  Checklist reviewing preventive services available has been given to the patient.  Reviewed patient's diet, addressing concerns and/or questions.   She is at risk for lack of exercise and has been provided with information to increase physical activity for the benefit of her well-being.   The patient was instructed to see the dentist every 6 months.   She is at risk for psychosocial distress and has been provided with information to reduce risk.   The patient's PHQ-9 score is consistent with severe depression. She was provided with information regarding depression.         CONSULTATION/REFERRAL to Neurology,  Primary Care - Care Coordination Referral, Mental Health    Subjective   Monserrat is a 44 year old, presenting for the following:  Physical        2/18/2025     8:45 AM   Additional Questions   Roomed by Patricia   Accompanied by Daughter         2/18/2025     8:45 AM   Patient Reported Additional Medications   Patient reports taking the following new medications none          HPI  Physical         Health Care Directive  Patient does not have a Health Care Directive: Discussed advance care planning with patient; information given to patient to review.      2/18/2025   General Health   How would you rate your overall physical health? (!) POOR   Feel stress (tense, anxious, or unable to sleep) Very much   (!) STRESS CONCERN      2/18/2025   Nutrition   Three or more servings of calcium each day? (!) I DON'T KNOW   Diet: Diabetic   How many servings of fruit and vegetables per day? (!) 2-3   How many sweetened beverages each day?  (!) 2         2/18/2025   Exercise   Days per week of moderate/strenous exercise 1 day   (!) EXERCISE CONCERN      2/18/2025   Social Factors   Frequency of gathering with friends or relatives Once a week   Worry food won't last until get money to buy more Yes   Food not last or not have enough money for food? Yes   Do you have housing? (Housing is defined as stable permanent housing and does not include staying ouside in a car, in a tent, in an abandoned building, in an overnight shelter, or couch-surfing.) No   Are you worried about losing your housing? No   Lack of transportation? No   Unable to get utilities (heat,electricity)? No   Want help with housing or utility concern? (!) YES   (!) FOOD SECURITY CONCERN PRESENT(!) HOUSING CONCERN PRESENT      2/18/2025   Dental   Dentist two times every year? (!) NO          Today's PHQ-9 Score:       2/18/2025     8:38 AM   PHQ-9 SCORE   PHQ-9 Total Score MyChart 20 (Severe depression)   PHQ-9 Total Score 20        Patient-reported         2/18/2025   Substance Use   Alcohol more than 3/day or more than 7/wk No   Do you use any other substances recreationally? No     Social History     Tobacco Use    Smoking status: Never    Smokeless tobacco: Never   Substance Use Topics    Alcohol use: No    Drug use: No          Mammogram Screening - Mammogram every 1-2 years updated in Health Maintenance based on mutual decision making        2/18/2025   STI Screening   New sexual partner(s) since last STI/HIV test? No     History of abnormal Pap smear: No - age 30-64 HPV with reflex Pap every 5 years recommended        Latest Ref Rng & Units 7/19/2018    12:47 PM 7/19/2018    10:00 AM 5/22/2014    12:00 AM   PAP / HPV   PAP (Historical)  NIL   NIL    HPV 16 DNA NEG^Negative  Negative     HPV 18 DNA NEG^Negative  Negative     Other HR HPV NEG^Negative  Negative       ASCVD Risk   The ASCVD Risk score (Demetrio KILGORE, et al., 2019) failed to calculate for the following reasons:     "Cannot find a previous HDL lab    Cannot find a previous total cholesterol lab    The BP treatment status is invalid        2/18/2025   Contraception/Family Planning   Questions about contraception or family planning No        Reviewed and updated as needed this visit by Provider                    Lab work is in process  Labs reviewed in EPIC      Review of Systems  Constitutional, HEENT, cardiovascular, pulmonary, gi and gu systems are negative, except as otherwise noted.     Objective    Exam  /78   Pulse 113   Temp 97.1  F (36.2  C) (Temporal)   Resp 16   Ht 1.536 m (5' 0.47\")   Wt 71.6 kg (157 lb 12.8 oz)   SpO2 97%   BMI 30.34 kg/m     Estimated body mass index is 30.34 kg/m  as calculated from the following:    Height as of this encounter: 1.536 m (5' 0.47\").    Weight as of this encounter: 71.6 kg (157 lb 12.8 oz).    Physical Exam  GENERAL: alert and no distress  EYES: Eyes grossly normal to inspection, PERRL and conjunctivae and sclerae normal  HENT: ear canals and TM's normal, nose and mouth without ulcers or lesions  NECK: no adenopathy, no asymmetry, masses, or scars  RESP: lungs clear to auscultation - no rales, rhonchi or wheezes  CV: regular rate and rhythm, normal S1 S2, no S3 or S4, no murmur, click or rub, no peripheral edema  ABDOMEN: soft, nontender, no hepatosplenomegaly, no masses and bowel sounds normal  MS: no gross musculoskeletal defects noted, no edema  SKIN: no suspicious lesions or rashes  NEURO: Normal strength and tone, mentation intact and speech normal  PSYCH: mentation appears normal, affect normal/bright        Signed Electronically by: PREM Domínguez CNP    Answers submitted by the patient for this visit:  Patient Health Questionnaire (Submitted on 2/18/2025)  If you checked off any problems, how difficult have these problems made it for you to do your work, take care of things at home, or get along with other people?: Very difficult  PHQ9 TOTAL SCORE: " 20

## 2025-02-19 ENCOUNTER — TELEPHONE (OUTPATIENT)
Dept: INTERNAL MEDICINE | Facility: CLINIC | Age: 45
End: 2025-02-19
Payer: MEDICAID

## 2025-02-19 ENCOUNTER — PATIENT OUTREACH (OUTPATIENT)
Dept: CARE COORDINATION | Facility: CLINIC | Age: 45
End: 2025-02-19
Payer: MEDICAID

## 2025-02-19 LAB
ANA PAT SER IF-IMP: ABNORMAL
ANA SER QL IF: POSITIVE
ANA TITR SER IF: ABNORMAL {TITER}

## 2025-02-19 NOTE — PROGRESS NOTES
Clinic Care Coordination Contact  Community Health Worker Initial Outreach    CHW Initial Information Gathering:  Referral Source: PCP  Current living arrangement:: Not Assessed  No PCP office visit in Past Year: No  CHW Additional Questions  If ED/Hospital discharge, follow-up appointment scheduled as recommended?: N/A  Medication changes made following ED/Hospital discharge?: N/A  MyChart active?: Yes  Patient sent Social Drivers of Health questionnaire?: No    Patient accepts CC: No, Patient expressed no additional support is needed at this time, further expressing that she needs support with being transferred to scheduling line to schedule follow up appointment with PCP - CHW acknowledged & provided CHW contact information. CHW was unable to send Patient Care Coordination introduction letter for future reference.     JAVIER Jeffrey  Clinic Care Coordination   [facility]   Phone: [phone]  [email or web address]

## 2025-02-19 NOTE — TELEPHONE ENCOUNTER
Test Results        Who ordered the test: Rowan    Type of test: Lab    Date of test:  2/18/25    Where was the test performed:  [facility]    What are your questions/concerns?:  awaiting results, please call    Could we send this information to you in [employer] or would you prefer to receive a phone call?:   Patient would prefer a phone call   Okay to leave a detailed message?: Yes at Cell number on file:    Telephone Information:   Mobile [phone]

## 2025-02-20 NOTE — TELEPHONE ENCOUNTER
Routing message to PCP:    Pt wanting to know about lab results from 2/18. Anything we can tell her about her labs?    Nathalia Resendez RN

## 2025-02-24 LAB
HPV HR 12 DNA CVX QL NAA+PROBE: NEGATIVE
HPV16 DNA CVX QL NAA+PROBE: NEGATIVE
HPV18 DNA CVX QL NAA+PROBE: NEGATIVE
HUMAN PAPILLOMA VIRUS FINAL DIAGNOSIS: NORMAL

## 2025-02-25 DIAGNOSIS — M25.50 MULTIPLE JOINT PAIN: Primary | ICD-10-CM

## 2025-02-25 DIAGNOSIS — D64.9 ANEMIA, UNSPECIFIED TYPE: ICD-10-CM

## 2025-02-25 DIAGNOSIS — E78.2 MIXED HYPERLIPIDEMIA: ICD-10-CM

## 2025-02-25 DIAGNOSIS — R76.8 ELEVATED ANTINUCLEAR ANTIBODY (ANA) LEVEL: ICD-10-CM

## 2025-02-25 NOTE — PROGRESS NOTES
Patient called and updated regarding lab results Discussed need for rheumatology referral and need for follow up lab, CBC 1 month and lipid panel in 6 months.

## 2025-03-05 ENCOUNTER — TELEPHONE (OUTPATIENT)
Dept: NEUROSURGERY | Facility: CLINIC | Age: 45
End: 2025-03-05
Payer: MEDICAID

## 2025-03-05 NOTE — TELEPHONE ENCOUNTER
Left Voicemail (1st Attempt) for the patient to call back and schedule the following:    Appointment type: New adult neurosg  Provider: Shelly Tong  Return date: First available after MRI  Specialty phone number: [phone]  Additional appointment(s) needed: MRI prior  Additonal Notes:  left message DX:  Arachnoid cyst

## 2025-03-08 ENCOUNTER — HEALTH MAINTENANCE LETTER (OUTPATIENT)
Age: 45
End: 2025-03-08

## 2025-03-12 ENCOUNTER — NURSE TRIAGE (OUTPATIENT)
Dept: INTERNAL MEDICINE | Facility: CLINIC | Age: 45
End: 2025-03-12
Payer: MEDICAID

## 2025-03-12 NOTE — TELEPHONE ENCOUNTER
"Reason for Disposition    SEVERE pelvic pain and present > 1 hour    Additional Information    Negative: Followed a genital area injury (e.g., vagina, vulva)    Negative: Menstrual cramps are main concern    Negative: Abdominal (stomach) pain is main concern    Negative: Vulvar (external genital area) pain or symptoms (e.g., dryness, sores, redness, blisters, bumps) is main concern    Negative: Rectal pain is main concern    Negative: Hip pain is main concern    Negative: Urination pain is main concern (pain with passing urine)    Negative: Pelvic pain and pregnant < 20 weeks    Negative: Pelvic pain and pregnant 20 or more weeks    Negative: Followed an abdomen (stomach) injury    Negative: SEVERE pelvic pain (e.g., excruciating) and vomiting    Answer Assessment - Initial Assessment Questions  1. LOCATION: \"Where does it hurt?\"       Vagina   2. RADIATION: \"Does the pain shoot anywhere else?\" (e.g., lower back, groin, thighs)      Abdomen   3. ONSET: \"When did the pain begin?\" (e.g., minutes, hours or days ago)       This morning   4. SUDDEN: \"Gradual or sudden onset?\"      Suddenly worse   5. PATTERN \"Does the pain come and go, or is it constant?\"      Constant   6. SEVERITY: \"How bad is the pain?\"  (e.g., Scale 1-10; mild, moderate, or severe)      10/10   7. RECURRENT SYMPTOM: \"Have you ever had this type of pelvic pain before?\" If Yes, ask: \"When was the last time?\" and \"What happened that time?\"       No   8. CAUSE: \"What do you think is causing the pelvic pain?\"      Unsure   9. RELIEVING/AGGRAVATING FACTORS: \"What makes it better or worse?\" (e.g., activity/rest, sexual intercourse, voiding, passing stool)      Unsure   10. OTHER SYMPTOMS: \"Has there been any other symptoms?\" (e.g., fever, constipation, diarrhea, urine problems, vaginal bleeding, vaginal discharge, or vomiting?\"          11. PREGNANCY: \"Is there any chance you are pregnant?\" \"When was your last menstrual period?\"    Protocols used: Pelvic " Pain - Female-A-[place]

## 2025-03-13 ENCOUNTER — OFFICE VISIT (OUTPATIENT)
Dept: OPTOMETRY | Facility: CLINIC | Age: 45
End: 2025-03-13
Payer: MEDICAID

## 2025-03-13 DIAGNOSIS — H52.4 PRESBYOPIA: ICD-10-CM

## 2025-03-13 DIAGNOSIS — H57.9 ITCHY EYES: ICD-10-CM

## 2025-03-13 DIAGNOSIS — E11.9 TYPE 2 DIABETES MELLITUS WITHOUT RETINOPATHY (H): ICD-10-CM

## 2025-03-13 DIAGNOSIS — H52.13 MYOPIA OF BOTH EYES: ICD-10-CM

## 2025-03-13 DIAGNOSIS — H52.223 REGULAR ASTIGMATISM OF BOTH EYES: ICD-10-CM

## 2025-03-13 DIAGNOSIS — Z01.01 ENCOUNTER FOR EXAMINATION OF EYES AND VISION WITH ABNORMAL FINDINGS: Primary | ICD-10-CM

## 2025-03-13 RX ORDER — AZELASTINE HYDROCHLORIDE 0.5 MG/ML
1 SOLUTION/ DROPS OPHTHALMIC 2 TIMES DAILY
Qty: 6 ML | Refills: 5 | Status: SHIPPED | OUTPATIENT
Start: 2025-03-13

## 2025-03-13 ASSESSMENT — KERATOMETRY
OS_K1POWER_DIOPTERS: 42.75
OD_AXISANGLE2_DEGREES: 176
OD_K1POWER_DIOPTERS: 43.00
OD_K2POWER_DIOPTERS: 43.75
OD_AXISANGLE_DEGREES: 086
OS_AXISANGLE2_DEGREES: 004
OS_K2POWER_DIOPTERS: 44.00
OS_AXISANGLE_DEGREES: 094

## 2025-03-13 ASSESSMENT — VISUAL ACUITY
OS_SC: 20/20
OD_SC+: +2
OS_SC: 20/60-2
OD_SC: 20/80
OS_SC+: -2
METHOD: SNELLEN - LINEAR
OD_SC: 20/25

## 2025-03-13 ASSESSMENT — REFRACTION_MANIFEST
OD_SPHERE: PLANO
OD_AXIS: 097
OD_CYLINDER: +0.50
OS_SPHERE: -0.50
OD_SPHERE: -0.50
OD_AXIS: 101
OS_ADD: +1.25
OS_SPHERE: -0.50
OS_AXIS: 097
OD_CYLINDER: +0.25
OD_ADD: +1.25
OS_CYLINDER: +0.75
METHOD_AUTOREFRACTION: 1
OS_CYLINDER: +1.00
OS_AXIS: 086

## 2025-03-13 ASSESSMENT — EXTERNAL EXAM - LEFT EYE: OS_EXAM: NORMAL

## 2025-03-13 ASSESSMENT — CONF VISUAL FIELD
METHOD: COUNTING FINGERS
OD_SUPERIOR_NASAL_RESTRICTION: 0
OS_INFERIOR_NASAL_RESTRICTION: 0
OD_INFERIOR_NASAL_RESTRICTION: 0
OD_SUPERIOR_TEMPORAL_RESTRICTION: 0
OD_INFERIOR_TEMPORAL_RESTRICTION: 0
OS_SUPERIOR_TEMPORAL_RESTRICTION: 0
OS_SUPERIOR_NASAL_RESTRICTION: 0
OS_INFERIOR_TEMPORAL_RESTRICTION: 0
OD_NORMAL: 1
OS_NORMAL: 1

## 2025-03-13 ASSESSMENT — SLIT LAMP EXAM - LIDS
COMMENTS: NORMAL
COMMENTS: NORMAL

## 2025-03-13 ASSESSMENT — TONOMETRY
OD_IOP_MMHG: 15
OS_IOP_MMHG: 15
IOP_METHOD: APPLANATION

## 2025-03-13 ASSESSMENT — CUP TO DISC RATIO
OS_RATIO: 0.1
OD_RATIO: 0.1

## 2025-03-13 ASSESSMENT — EXTERNAL EXAM - RIGHT EYE: OD_EXAM: NORMAL

## 2025-03-13 NOTE — LETTER
3/13/2025      Monserrat White  [address]  [place] [address]      Dear Colleague,    Thank you for referring your patient, Monserrat White, to the [facility]. Please see a copy of my visit note below.    Chief Complaint   Patient presents with     Diabetic Eye Exam     Accompanied by /daughter Jessica     Chief Complaint(s) and History of Present Illness(es)       Diabetic Eye Exam              Diabetes Type: Type 2, on insulin and taking oral medications    Duration: years    Blood Sugars: is controlled (Checks a few times per month)                   Lab Results   Component Value Date    A1C 5.7 06/29/2018    A1C 5.9 12/08/2014    A1C 5.5 12/14/2012    A1C 5.7 07/26/2012    A1C 5.5 02/29/2012     -Optic nerve swelling each eye - last monitored with ophth by Dr. Zarco in 2012 - saw Dr. Chow 2012 and dx with arachnoid cyst - recommended she continue monitoring with neuro-ophth but did not follow up on that recommendation - does have appointment for MRI 3/20/2025 and appt with neurology on 3/28/25 to evaluate     -Currently having extended migraine x 1 month - treats with Ibuprofen - Long hx of migraines - ongoing for 10+ yrs          Last Eye Exam: 2012  Dilated Previously: Yes, side effects of dilation explained today    What are you currently using to see?  does not use glasses or contacts    Distance Vision Acuity: Satisfied with vision    Near Vision Acuity: Not satisfied     Eye Comfort: itchy   Do you use eye drops? : No  Occupation or Hobbies: Home - 3+ hrs/day on screen     Natasha Cox  Optometry Assistant     Medical, surgical and family histories reviewed and updated 3/13/2025.       OBJECTIVE: See Ophthalmology exam    ASSESSMENT:    ICD-10-CM    1. Encounter for examination of eyes and vision with abnormal findings  Z01.01       2. Type 2 diabetes mellitus without retinopathy (H)  E11.9       3. Itchy eyes  H57.9       4.  Myopia of both eyes  H52.13       5. Regular astigmatism of both eyes  H52.223       6. Presbyopia  H52.4           PLAN:    Monserrat White aware  eye exam results will be sent to No Ref-Primary, Physician.  Patient Instructions   Patient educated on importance of good blood sugar control.  Letter sent to primary care provider with diabetic eye exam report.     Begin Optivar eyedrops twice daily as needed for itching.     Reading glasses prescription provided today.     Return in 1 year for a comprehensive eye exam, or sooner if needed.      The effects of the dilating drops last for 4- 6 hours.  You will be more sensitive to light and vision will be blurry up close.  Mydriatic sunglasses were given if needed.     Cesar Gonzalez, WENDY  [address] [place]  [id number] [phone]           Again, thank you for allowing me to participate in the care of your patient.        Sincerely,        Cesar Gonzalez OD    Electronically signed

## 2025-03-13 NOTE — PATIENT INSTRUCTIONS
Patient educated on importance of good blood sugar control.  Letter sent to primary care provider with diabetic eye exam report.     Begin Optivar eyedrops twice daily as needed for itching.     Reading glasses prescription provided today.     Return in 1 year for a comprehensive eye exam, or sooner if needed.      The effects of the dilating drops last for 4- 6 hours.  You will be more sensitive to light and vision will be blurry up close.  Mydriatic sunglasses were given if needed.     Cesar Gonzalez, WENDY  [address]  [place]  [address]    [phone]

## 2025-03-13 NOTE — PROGRESS NOTES
Chief Complaint   Patient presents with    Diabetic Eye Exam     Accompanied by /daughter Jessica     Chief Complaint(s) and History of Present Illness(es)       Diabetic Eye Exam              Diabetes Type: Type 2, on insulin and taking oral medications    Duration: years    Blood Sugars: is controlled (Checks a few times per month)                   Lab Results   Component Value Date    A1C 5.7 06/29/2018    A1C 5.9 12/08/2014    A1C 5.5 12/14/2012    A1C 5.7 07/26/2012    A1C 5.5 02/29/2012     -Optic nerve swelling each eye - last monitored with ophth by Dr. Zarco in 2012 - saw Dr. Chow 2012 and dx with arachnoid cyst - recommended she continue monitoring with neuro-ophth but did not follow up on that recommendation - does have appointment for MRI 3/20/2025 and appt with neurology on 3/28/25 to evaluate     -Currently having extended migraine x 1 month - treats with Ibuprofen - Long hx of migraines - ongoing for 10+ yrs          Last Eye Exam: 2012  Dilated Previously: Yes, side effects of dilation explained today    What are you currently using to see?  does not use glasses or contacts    Distance Vision Acuity: Satisfied with vision    Near Vision Acuity: Not satisfied     Eye Comfort: itchy   Do you use eye drops? : No  Occupation or Hobbies: Home - 3+ hrs/day on screen     Natasha [facility]  Optometry Assistant     Medical, surgical and family histories reviewed and updated 3/13/2025.       OBJECTIVE: See Ophthalmology exam    ASSESSMENT:    ICD-10-CM    1. Encounter for examination of eyes and vision with abnormal findings  Z01.01       2. Type 2 diabetes mellitus without retinopathy (H)  E11.9       3. Itchy eyes  H57.9       4. Myopia of both eyes  H52.13       5. Regular astigmatism of both eyes  H52.223       6. Presbyopia  H52.4           PLAN:    Monserrat White aware  eye exam results will be sent to No Ref-Primary, Physician.  Patient Instructions   Patient educated on importance  of good blood sugar control.  Letter sent to primary care provider with diabetic eye exam report.     Begin Optivar eyedrops twice daily as needed for itching.     Reading glasses prescription provided today.     Return in 1 year for a comprehensive eye exam, or sooner if needed.      The effects of the dilating drops last for 4- 6 hours.  You will be more sensitive to light and vision will be blurry up close.  Mydriatic sunglasses were given if needed.     Cesar Gonzalez, OD  [facility] [place]  [address] [place] [address]. [place]  [place]  [id number]    [phone]

## 2025-03-17 DIAGNOSIS — F40.240 CLAUSTROPHOBIA: Primary | ICD-10-CM

## 2025-03-17 NOTE — TELEPHONE ENCOUNTER
Routing to Neurology      Pt's daughter calling for her mom    Pt is having concerns with upcoming MRI ordered through neurology.  She was wondering if provider can order something to help with claustrophobia during MRI.    Please call patient back       NITIN Heck    Triage Nurse  [facility]

## 2025-03-18 RX ORDER — HYDROXYZINE HYDROCHLORIDE 25 MG/1
TABLET, FILM COATED ORAL
Qty: 2 TABLET | Refills: 0 | Status: SHIPPED | OUTPATIENT
Start: 2025-03-18

## 2025-03-18 NOTE — TELEPHONE ENCOUNTER
Hi all,    Would someone be willing to speak with this patient about medication for claustrophobia regarding her upcoming MRI? I have never seen her, and her last visit with Dr. Chow was many years ago, so I don't feel comfortable prescribing valium without talking with her. I'm happy to sign this if it seems appropriate after triaging. Hydroxyzine is an option too, which I see has been on her med list in the past, if this has been helpful for her for anxiety.    Thanks,  Shelly     Spoke with son Vadim, Vadim states patient needing medication for anxiety and Claustrophobia for MRI scheduled on 4/20/25.    Patient has used Hydroxyzine in the past for anxiety, will order again for anxiety/claustrophobia  prior to MRI.  Patient must have a ride home after MRI procedure.   Vadim voices understanding, verified Pharmacy.

## 2025-03-20 ENCOUNTER — OFFICE VISIT (OUTPATIENT)
Dept: FAMILY MEDICINE | Facility: CLINIC | Age: 45
End: 2025-03-20
Payer: MEDICAID

## 2025-03-20 VITALS
OXYGEN SATURATION: 99 % | TEMPERATURE: 97.1 F | HEART RATE: 76 BPM | DIASTOLIC BLOOD PRESSURE: 74 MMHG | BODY MASS INDEX: 30.09 KG/M2 | SYSTOLIC BLOOD PRESSURE: 111 MMHG | RESPIRATION RATE: 16 BRPM | WEIGHT: 159.4 LBS | HEIGHT: 61 IN

## 2025-03-20 DIAGNOSIS — J02.0 STREP PHARYNGITIS: Primary | ICD-10-CM

## 2025-03-20 DIAGNOSIS — N94.9 VAGINAL SYMPTOM: ICD-10-CM

## 2025-03-20 DIAGNOSIS — B37.31 CANDIDIASIS OF VAGINA: ICD-10-CM

## 2025-03-20 DIAGNOSIS — J02.9 SORE THROAT: ICD-10-CM

## 2025-03-20 DIAGNOSIS — N90.89 LABIA IRRITATION: ICD-10-CM

## 2025-03-20 LAB
CLUE CELLS: ABNORMAL
DEPRECATED S PYO AG THROAT QL EIA: POSITIVE
TRICHOMONAS, WET PREP: ABNORMAL
WBC'S/HIGH POWER FIELD, WET PREP: ABNORMAL
YEAST, WET PREP: PRESENT

## 2025-03-20 RX ORDER — FLUCONAZOLE 150 MG/1
150 TABLET ORAL
Qty: 3 TABLET | Refills: 0 | Status: SHIPPED | OUTPATIENT
Start: 2025-03-20 | End: 2025-03-27

## 2025-03-20 RX ORDER — CEPHALEXIN 500 MG/1
500 CAPSULE ORAL 2 TIMES DAILY
Qty: 20 CAPSULE | Refills: 0 | Status: SHIPPED | OUTPATIENT
Start: 2025-03-20 | End: 2025-03-30

## 2025-03-20 NOTE — PATIENT INSTRUCTIONS
Strep Throat: Care Instructions  Overview     Strep throat is a bacterial infection that causes sudden, severe sore throat and fever. Symptoms may include red, swollen tonsils that may have white or yellow patches. A strep test may be needed to tell if the sore throat is caused by strep bacteria. Treatment can help ease symptoms and may prevent future problems.  Follow-up care is a key part of your treatment and safety. Be sure to make and go to all appointments, and call your doctor if you are having problems. It's also a good idea to know your test results and keep a list of the medicines you take.  How can you care for yourself at home?  Take your antibiotics as directed. Do not stop taking them just because you feel better. You need to take the full course of antibiotics.  Strep throat can spread to others until 24 hours after you begin taking antibiotics. During this time, avoid contact with other people at work, school, or home, especially infants and children. Do not sneeze or cough on others, and wash your hands often. Keep your drinking glass and eating utensils separate from those of others. Wash these items well in hot, soapy water.  Gargle with warm salt water several times a day to help reduce swelling and relieve pain. Mix 1/2 teaspoon of salt in 1 cup of warm water.  Take an over-the-counter pain medication, such as acetaminophen (Tylenol), ibuprofen (Advil, Motrin), or naproxen (Aleve). Read and follow all instructions on the label.  Try an over-the-counter anesthetic throat spray or throat lozenges, which may help relieve throat pain.  Drink plenty of fluids. Fluids may help soothe an irritated throat. Hot fluids, such as tea or soup, may help your throat feel better.  Eat soft solids and drink plenty of liquids. Flavored ice pops, ice cream, scrambled eggs, sherbet, and gelatin dessert (such as Jell-O) may also soothe the throat.  Get lots of rest.  If you smoke, try to quit. If you can't quit, cut  "back as much as you can. Smoking can interfere with healing. If you need help quitting, talk to your doctor about stop-smoking programs and medicines. These can increase your chances of quitting for good.  Use a vaporizer or humidifier to add moisture to the air where you sleep. Follow the directions for cleaning the machine.  When should you call for help?   Call your doctor now or seek immediate medical care if:    You have new or worse symptoms of infection, such as:  A new or higher fever.  A fever with a stiff neck or severe headache.  New or worse trouble swallowing.  Pain that becomes much worse on one side of your throat.   Watch closely for changes in your health, and be sure to contact your doctor if:    You are not getting better after 2 days (48 hours) after taking an antibiotic.   Where can you learn more?  Go to https://www.[employer].net/patiented  Enter K625 in the search box to learn more about \"Strep Throat: Care Instructions.\"  Current as of: October 27, 2024  Content Version: 14.4    7817-3142 [employer].   Care instructions adapted under license by your healthcare professional. If you have questions about a medical condition or this instruction, always ask your healthcare professional. [employer] disclaims any warranty or liability for your use of this information.    "

## 2025-03-20 NOTE — PROGRESS NOTES
"  Assessment & Plan     Strep pharyngitis  Sore throat  Will treat. Penicillin allergy of rash. I discussed with the patient risks and benefits of the new medication prescribed including potential side effects.  The patient had opportunity to ask questions and is comfortable with and interested in medications as prescribed.   - cephALEXin (KEFLEX) 500 MG capsule; Take 1 capsule (500 mg) by mouth 2 times daily for 10 days.  - Streptococcus A Rapid Screen w/Reflex to PCR - Clinic Collect    Candidiasis of vagina  Vaginal symptom  Labia irritation  On exam it appears her pain is located more externally along the lips of the labia majora. Suggest using vaseline for lubrication and protection. Has her period right now as well which may help to decrease her symptoms. Treat after antibiotic course.   - fluconazole (DIFLUCAN) 150 MG tablet; Take 1 tablet (150 mg) by mouth every 3 days for 3 doses.  - Wet prep - Clinic Collect        MED REC REQUIRED  Post Medication Reconciliation Status: discharge medications reconciled, continue medications without change  BMI  Estimated body mass index is 30.57 kg/m  as calculated from the following:    Height as of this encounter: 1.538 m (5' 0.55\").    Weight as of this encounter: 72.3 kg (159 lb 6.4 oz).   Weight management plan: Discussed healthy diet and exercise guidelines          Subjective   German is a 45 year old, presenting for the following health issues:  ER F/U      3/20/2025     8:24 AM   Additional Questions   Roomed by An V.   Accompanied by Daughter         3/20/2025     8:24 AM   Patient Reported Additional Medications   Patient reports taking the following new medications none     HPI        ED/UC Followup:    Facility:  [facility]  Date of visit: 03/13/2025  Reason for visit: Vaginal pain  Current Status: pain is getting worse     Was treated with diflucan x2 after ED visit. Denies pelvic pain. Has period - started today.    Also has 3 kids with strep right " "now. New sore throat today. Would like testing.          Review of Systems  Constitutional, HEENT, cardiovascular, pulmonary, gi and gu systems are negative, except as otherwise noted.      Objective    /74   Pulse 76   Temp 97.1  F (36.2  C) (Temporal)   Resp 16   Ht 1.538 m (5' 0.55\")   Wt 72.3 kg (159 lb 6.4 oz)   LMP 03/20/2025 (Exact Date)   SpO2 99%   BMI 30.57 kg/m    Body mass index is 30.57 kg/m .  Physical Exam   GENERAL: alert and no distress  HENT: ear canals and TM's normal, nose and mouth without ulcers or lesions   (female): normal female external genitalia, normal urethral meatus, normal vaginal mucosa, + bloody discharge, + trace white discharge along outer lips of labia majora            Signed Electronically by: Lila Gonzalez PA-C    "

## 2025-03-22 DIAGNOSIS — D25.1 INTRAMURAL LEIOMYOMA OF UTERUS: Primary | ICD-10-CM

## 2025-03-24 ENCOUNTER — TELEPHONE (OUTPATIENT)
Dept: OPTOMETRY | Facility: CLINIC | Age: 45
End: 2025-03-24

## 2025-03-24 NOTE — CONFIDENTIAL NOTE
NEUROSURGERY - NEW PREVISIT PLANNING    Referring Provider: Stefani Donahue APRN CNP    OVN 2/18/25   Reason For Visit: G93.0 (ICD-10-CM) - Arachnoid cyst of posterior cranial fossa        IMAGING STATUS/LOCATION DATE/TYPE   MRI PACS 03/20/2025  Brain  [facility]   CT N/A    XRAY N/A    NOTES STATUS/LOCATION DATE/TYPE   Other specialist OVN: N/A    EMG N/A    INJECTION N/A    PHYSICAL THERAPY N/A    SURGERY N/A

## 2025-03-26 ENCOUNTER — APPOINTMENT (OUTPATIENT)
Dept: OPTOMETRY | Facility: CLINIC | Age: 45
End: 2025-03-26
Payer: MEDICAID

## 2025-03-26 PROCEDURE — 92340 FIT SPECTACLES MONOFOCAL: CPT | Performed by: OPTOMETRIST

## 2025-03-26 NOTE — TELEPHONE ENCOUNTER
Retail Pharmacy Prior Authorization Team   Phone: [phone]    PA Initiation    Medication: AZELASTINE HCL 0.05 % OP SOLN  Insurance Company: [employer] ([employer] for [place] Medicaid Phone [phone] Fax [phone]  Pharmacy Filling the Rx: [id number] - [place], [place] - [address]  Filling Pharmacy Phone: [phone]  Filling Pharmacy Fax:    Start Date: 3/26/2025

## 2025-03-26 NOTE — TELEPHONE ENCOUNTER
Note: Due to record-high volumes, our turn-around time is taking longer than usual . We are currently 3  business days behind in the pools.   We are working diligently to submit all requests in a timely manner and in the order they are received. Please only flag TRUE URGENT requests as high priority to the pool at this time.   If you have questions on status of PA's,  please send a note/message in the active PA encounter and send back to the [facility] PA pool [[id number]].    If you have questions about the turn-around time or about our process, please reach out to our supervisor Liza Wells.   Thank you!   RPPA (Retail Pharmacy Prior Authorization) team    PRIOR AUTHORIZATION DENIED    Medication: AZELASTINE HCL 0.05 % OP SOLN  Insurance Company: [employer] for [place] Medicaid Phone [phone] Fax [phone]  Denial Date: 3/26/2025  Denial Rational:           Appeal Information:     If provider would like to appeal please review the plan's reasons for denial listed above. Please utilize that information to complete letter and provide specific, detailed clinical information/rationale of your patient's health status to address their denial reasons.      Patient Notified: No

## 2025-03-28 ENCOUNTER — PRE VISIT (OUTPATIENT)
Dept: NEUROSURGERY | Facility: CLINIC | Age: 45
End: 2025-03-28

## 2025-04-21 DIAGNOSIS — F40.240 CLAUSTROPHOBIA: ICD-10-CM

## 2025-04-21 RX ORDER — HYDROXYZINE HYDROCHLORIDE 25 MG/1
TABLET, FILM COATED ORAL
Qty: 2 TABLET | Refills: 0 | Status: SHIPPED | OUTPATIENT
Start: 2025-04-21

## 2025-04-21 NOTE — TELEPHONE ENCOUNTER
"New Medication Request        What medication are you requesting?: sedation or \"tranquilizer\" per patient request  (hydrOXYzine HCl (ATARAX) 25 MG tablet)     Reason for medication request: Patient has a broken tooth, she was scheduled to have the dental work done but had a panic attack and couldn't get the tooth taken care of. Patient is requesting this medication for the next time she scheduled the dental procedure     Have you taken this medication before?: Yes: patient states she had been prescribed this in the past    Controlled Substance Agreement on file:   CSA -- Patient Level:    CSA: None found at the patient level.         Patient offered an appointment? No    Preferred Pharmacy:     [id number] [address] [place] [address] AT CORNER OF [address] [place] [id number]  Phone: [phone] Fax: [phone]      Could we send this information to you in [facility] or would you prefer to receive a phone call?:   Patient would prefer a phone call   Okay to leave a detailed message?: Yes at Cell number on file:    Telephone Information:   Mobile [phone]       "

## 2025-04-21 NOTE — TELEPHONE ENCOUNTER
Daughter and Pt calling. Please see note below. Dental appointment is Wednesday.    Please call patient back with provider response. Ok to MIKE Tsai RN on 4/21/2025 at 1:09 PM     Pt reports that her blood pressure and heart \"are up\" Pt states this started around 8:00 pm. Pt has been taking blood pressure medications. 187.96

## 2025-05-21 ENCOUNTER — OFFICE VISIT (OUTPATIENT)
Dept: FAMILY MEDICINE | Facility: CLINIC | Age: 45
End: 2025-05-21
Payer: MEDICAID

## 2025-05-21 VITALS
WEIGHT: 156 LBS | DIASTOLIC BLOOD PRESSURE: 72 MMHG | RESPIRATION RATE: 18 BRPM | BODY MASS INDEX: 29.45 KG/M2 | OXYGEN SATURATION: 98 % | HEART RATE: 78 BPM | HEIGHT: 61 IN | TEMPERATURE: 98.6 F | SYSTOLIC BLOOD PRESSURE: 111 MMHG

## 2025-05-21 DIAGNOSIS — O24.414 INSULIN CONTROLLED GESTATIONAL DIABETES MELLITUS (GDM) DURING PREGNANCY, ANTEPARTUM: Primary | ICD-10-CM

## 2025-05-21 DIAGNOSIS — N94.6 DYSMENORRHEA: ICD-10-CM

## 2025-05-21 DIAGNOSIS — J30.81 ALLERGIC RHINITIS DUE TO ANIMALS: ICD-10-CM

## 2025-05-21 DIAGNOSIS — E55.9 VITAMIN D DEFICIENCY: ICD-10-CM

## 2025-05-21 DIAGNOSIS — D50.0 IRON DEFICIENCY ANEMIA DUE TO CHRONIC BLOOD LOSS: ICD-10-CM

## 2025-05-21 DIAGNOSIS — D21.9 FIBROID TUMOR: ICD-10-CM

## 2025-05-21 DIAGNOSIS — F34.1 PERSISTENT DEPRESSIVE DISORDER: ICD-10-CM

## 2025-05-21 LAB
BASOPHILS # BLD AUTO: 0.1 10E3/UL (ref 0–0.2)
BASOPHILS NFR BLD AUTO: 1 %
EOSINOPHIL # BLD AUTO: 0.6 10E3/UL (ref 0–0.7)
EOSINOPHIL NFR BLD AUTO: 7 %
ERYTHROCYTE [DISTWIDTH] IN BLOOD BY AUTOMATED COUNT: 16.7 % (ref 10–15)
EST. AVERAGE GLUCOSE BLD GHB EST-MCNC: 123 MG/DL
HBA1C MFR BLD: 5.9 % (ref 0–5.6)
HCT VFR BLD AUTO: 33.8 % (ref 35–47)
HGB BLD-MCNC: 10.1 G/DL (ref 11.7–15.7)
IMM GRANULOCYTES # BLD: 0 10E3/UL
IMM GRANULOCYTES NFR BLD: 0 %
LYMPHOCYTES # BLD AUTO: 3.2 10E3/UL (ref 0.8–5.3)
LYMPHOCYTES NFR BLD AUTO: 39 %
MCH RBC QN AUTO: 22.9 PG (ref 26.5–33)
MCHC RBC AUTO-ENTMCNC: 29.9 G/DL (ref 31.5–36.5)
MCV RBC AUTO: 77 FL (ref 78–100)
MONOCYTES # BLD AUTO: 0.6 10E3/UL (ref 0–1.3)
MONOCYTES NFR BLD AUTO: 7 %
NEUTROPHILS # BLD AUTO: 3.8 10E3/UL (ref 1.6–8.3)
NEUTROPHILS NFR BLD AUTO: 46 %
PLATELET # BLD AUTO: 394 10E3/UL (ref 150–450)
RBC # BLD AUTO: 4.41 10E6/UL (ref 3.8–5.2)
WBC # BLD AUTO: 8.2 10E3/UL (ref 4–11)

## 2025-05-21 PROCEDURE — 36415 COLL VENOUS BLD VENIPUNCTURE: CPT | Performed by: INTERNAL MEDICINE

## 2025-05-21 PROCEDURE — 1126F AMNT PAIN NOTED NONE PRSNT: CPT | Performed by: INTERNAL MEDICINE

## 2025-05-21 PROCEDURE — 85025 COMPLETE CBC W/AUTO DIFF WBC: CPT | Performed by: INTERNAL MEDICINE

## 2025-05-21 PROCEDURE — 83550 IRON BINDING TEST: CPT | Performed by: INTERNAL MEDICINE

## 2025-05-21 PROCEDURE — 82043 UR ALBUMIN QUANTITATIVE: CPT | Performed by: INTERNAL MEDICINE

## 2025-05-21 PROCEDURE — 3078F DIAST BP <80 MM HG: CPT | Performed by: INTERNAL MEDICINE

## 2025-05-21 PROCEDURE — 82306 VITAMIN D 25 HYDROXY: CPT | Performed by: INTERNAL MEDICINE

## 2025-05-21 PROCEDURE — 83540 ASSAY OF IRON: CPT | Performed by: INTERNAL MEDICINE

## 2025-05-21 PROCEDURE — 3074F SYST BP LT 130 MM HG: CPT | Performed by: INTERNAL MEDICINE

## 2025-05-21 PROCEDURE — 80053 COMPREHEN METABOLIC PANEL: CPT | Performed by: INTERNAL MEDICINE

## 2025-05-21 PROCEDURE — 99214 OFFICE O/P EST MOD 30 MIN: CPT | Performed by: INTERNAL MEDICINE

## 2025-05-21 PROCEDURE — 3044F HG A1C LEVEL LT 7.0%: CPT | Performed by: INTERNAL MEDICINE

## 2025-05-21 PROCEDURE — 83036 HEMOGLOBIN GLYCOSYLATED A1C: CPT | Performed by: INTERNAL MEDICINE

## 2025-05-21 PROCEDURE — 82570 ASSAY OF URINE CREATININE: CPT | Performed by: INTERNAL MEDICINE

## 2025-05-21 RX ORDER — CETIRIZINE HYDROCHLORIDE 10 MG/1
10 TABLET ORAL DAILY
Qty: 90 TABLET | Refills: 2 | Status: SHIPPED | OUTPATIENT
Start: 2025-05-21

## 2025-05-21 RX ORDER — IBUPROFEN 600 MG/1
600 TABLET, FILM COATED ORAL EVERY 6 HOURS PRN
Qty: 60 TABLET | Refills: 1 | Status: SHIPPED | OUTPATIENT
Start: 2025-05-21

## 2025-05-21 RX ORDER — AZELASTINE 1 MG/ML
1 SPRAY, METERED NASAL 2 TIMES DAILY
Qty: 30 ML | Refills: 3 | Status: SHIPPED | OUTPATIENT
Start: 2025-05-21

## 2025-05-21 RX ORDER — DULOXETIN HYDROCHLORIDE 30 MG/1
30 CAPSULE, DELAYED RELEASE ORAL DAILY
Qty: 90 CAPSULE | Refills: 2 | Status: SHIPPED | OUTPATIENT
Start: 2025-05-21

## 2025-05-21 ASSESSMENT — ANXIETY QUESTIONNAIRES
6. BECOMING EASILY ANNOYED OR IRRITABLE: MORE THAN HALF THE DAYS
GAD7 TOTAL SCORE: 8
3. WORRYING TOO MUCH ABOUT DIFFERENT THINGS: MORE THAN HALF THE DAYS
4. TROUBLE RELAXING: SEVERAL DAYS
2. NOT BEING ABLE TO STOP OR CONTROL WORRYING: SEVERAL DAYS
8. IF YOU CHECKED OFF ANY PROBLEMS, HOW DIFFICULT HAVE THESE MADE IT FOR YOU TO DO YOUR WORK, TAKE CARE OF THINGS AT HOME, OR GET ALONG WITH OTHER PEOPLE?: VERY DIFFICULT
GAD7 TOTAL SCORE: 8
5. BEING SO RESTLESS THAT IT IS HARD TO SIT STILL: NOT AT ALL
7. FEELING AFRAID AS IF SOMETHING AWFUL MIGHT HAPPEN: SEVERAL DAYS
GAD7 TOTAL SCORE: 8
1. FEELING NERVOUS, ANXIOUS, OR ON EDGE: SEVERAL DAYS
7. FEELING AFRAID AS IF SOMETHING AWFUL MIGHT HAPPEN: SEVERAL DAYS
IF YOU CHECKED OFF ANY PROBLEMS ON THIS QUESTIONNAIRE, HOW DIFFICULT HAVE THESE PROBLEMS MADE IT FOR YOU TO DO YOUR WORK, TAKE CARE OF THINGS AT HOME, OR GET ALONG WITH OTHER PEOPLE: VERY DIFFICULT

## 2025-05-21 ASSESSMENT — PAIN SCALES - GENERAL: PAINLEVEL_OUTOF10: NO PAIN (0)

## 2025-05-21 NOTE — PROGRESS NOTES
Assessment & Plan   Problem List Items Addressed This Visit       Insulin controlled gestational diabetes mellitus (GDM) during pregnancy, antepartum - Primary    Relevant Orders    HEMOGLOBIN A1C (Completed)    Albumin Random Urine Quantitative with Creat Ratio (Completed)     Other Visit Diagnoses         Dysmenorrhea        Relevant Medications    ibuprofen (ADVIL/MOTRIN) 600 MG tablet    Other Relevant Orders    Ob/Gyn  Referral      Persistent depressive disorder        Relevant Medications    DULoxetine (CYMBALTA) 30 MG capsule    Other Relevant Orders    Comprehensive metabolic panel (BMP + Alb, Alk Phos, ALT, AST, Total. Bili, TP) (Completed)    Adult Mental Health  Referral      Vitamin D deficiency        Relevant Orders    Vitamin D Deficiency (Completed)      Iron deficiency anemia due to chronic blood loss        Relevant Orders    CBC with platelets and differential (Completed)    Iron and iron binding capacity (Completed)      Fibroid tumor        Relevant Medications    ibuprofen (ADVIL/MOTRIN) 600 MG tablet    Other Relevant Orders    Iron and iron binding capacity (Completed)    Ob/Gyn  Referral      Allergic rhinitis due to animals        Relevant Medications    azelastine (ASTELIN) 0.1 % nasal spray    cetirizine (ZYRTEC) 10 MG tablet                      Subjective   German is a 45 year old, presenting for the following health issues:  Medication Follow-up        5/21/2025     3:09 PM   Additional Questions   Roomed by Paula     History of Present Illness       Mental Health Follow-up:  Patient presents to follow-up on Depression & Anxiety.Patient's depression since last visit has been:  No change  The patient is having other symptoms associated with depression.  Patient's anxiety since last visit has been:  Medium  The patient is having other symptoms associated with anxiety.  Any significant life events: financial concerns, grief or loss and health  "concerns  Patient is feeling anxious or having panic attacks.  Patient has no concerns about alcohol or drug use.    Diabetes:   She presents for follow up of diabetes.  She is checking home blood glucose two times daily.   She checks blood glucose before and after meals.  Blood glucose is never over 200 and never under 70. She is aware of hypoglycemia symptoms including dizziness.   She is concerned about frequent infections.   She is having numbness in feet and weight gain.            Hyperlipidemia:  She presents for follow up of hyperlipidemia.   She is not taking medication to lower cholesterol. She is not having myalgia or other side effects to statin medications.    Headaches:   Since the patient's last clinic visit, headaches are: worsened  The patient is getting headaches:  Almost every day  She is able to do normal daily activities when she has a migraine.  The patient is taking the following rescue/relief medications:  Ibuprofen (Advil, Motrin) and Tylenol   Patient states \"I get some relief\" from the rescue/relief medications.   The patient is taking the following medications to prevent migraines:  No medications to prevent migraines  In the past 4 weeks, the patient has gone to an Urgent Care or Emergency Room 0 times times due to headaches.    She eats 2-3 servings of fruits and vegetables daily.She consumes 2 sweetened beverage(s) daily.She exercises with enough effort to increase her heart rate 9 or less minutes per day.  She exercises with enough effort to increase her heart rate 3 or less days per week. She is missing 1 dose(s) of medications per week.  She is not taking prescribed medications regularly due to remembering to take.      4month    Grandma / loss   Struggling with something else in December 2018   Checking blood sugars  Has fivbrois                   Review of Systems  Constitutional, HEENT, cardiovascular, pulmonary, gi and gu systems are negative, except as otherwise noted.    " "  Objective    /72 (BP Location: Right arm, Patient Position: Sitting, Cuff Size: Adult Regular)   Pulse 78   Temp 98.6  F (37  C) (Temporal)   Resp 18   Ht 1.537 m (5' 0.5\")   Wt 70.8 kg (156 lb)   LMP  (LMP Unknown)   SpO2 98%   BMI 29.97 kg/m    Body mass index is 29.97 kg/m .  Physical Exam   GENERAL: alert and no distress  EYES: Eyes grossly normal to inspection, PERRL and conjunctivae and sclerae normal  HENT: ear canals and TM's normal, nose and mouth without ulcers or lesions  NECK: no adenopathy, no asymmetry, masses, or scars  RESP: lungs clear to auscultation - no rales, rhonchi or wheezes  CV: regular rate and rhythm, normal S1 S2, no S3 or S4, no murmur, click or rub, no peripheral edema  ABDOMEN: soft, nontender, no hepatosplenomegaly, no masses and bowel sounds normal  MS: no gross musculoskeletal defects noted, no edema  SKIN: no suspicious lesions or rashes  NEURO: Normal strength and tone, mentation intact and speech normal  CRANIAL NERVES: Discs flat. Pupils equal, round and reactive to light. Extraocular movements full. Visual fields full. Face moves symmetrically. Tongue midline. Hearing intact to finger rubbing. CARDIOVASCULAR: S1, S2.   NEUROLOGIC: Motor strength 5/5, reflexes 2/4. Toe signs are down-going. Good finger-nose-finger, fine finger movement, and heel-shin maneuvers. Gait normal-based.    BACK: no CVA tenderness, no paralumbar tenderness  PSYCH: mentation appears normal, affect normal/bright  LYMPH: no cervical, supraclavicular, axillary, or inguinal adenopathy    Results for orders placed or performed in visit on 05/21/25   HEMOGLOBIN A1C     Status: Abnormal   Result Value Ref Range    Estimated Average Glucose 123 (H) <117 mg/dL    Hemoglobin A1C 5.9 (H) 0.0 - 5.6 %   Albumin Random Urine Quantitative with Creat Ratio     Status: Abnormal   Result Value Ref Range    Creatinine Urine mg/dL 71.3 mg/dL    Albumin Urine mg/L 35.6 mg/L    Albumin Urine mg/g Cr 49.93 (H) " 0.00 - 25.00 mg/g Cr   Comprehensive metabolic panel (BMP + Alb, Alk Phos, ALT, AST, Total. Bili, TP)     Status: Abnormal   Result Value Ref Range    Sodium 140 135 - 145 mmol/L    Potassium 4.3 3.4 - 5.3 mmol/L    Carbon Dioxide (CO2) 24 22 - 29 mmol/L    Anion Gap 11 7 - 15 mmol/L    Urea Nitrogen 7.8 6.0 - 20.0 mg/dL    Creatinine 0.47 (L) 0.51 - 0.95 mg/dL    GFR Estimate >90 >60 mL/min/1.73m2    Calcium 9.2 8.8 - 10.4 mg/dL    Chloride 105 98 - 107 mmol/L    Glucose 103 (H) 70 - 99 mg/dL    Alkaline Phosphatase 85 40 - 150 U/L    AST 30 0 - 45 U/L    ALT 34 0 - 50 U/L    Protein Total 7.8 6.4 - 8.3 g/dL    Albumin 4.1 3.5 - 5.2 g/dL    Bilirubin Total 0.2 <=1.2 mg/dL   Vitamin D Deficiency     Status: Normal   Result Value Ref Range    Vitamin D, Total (25-Hydroxy) 27 20 - 50 ng/mL    Narrative    Season, race, dietary intake, and treatment affect the concentration of 25-hydroxy-Vitamin D. Values may decrease during winter months and increase during summer months.    Vitamin D determination is routinely performed by an immunoassay specific for 25 hydroxyvitamin D3.  If an individual is on vitamin D2(ergocalciferol) supplementation, please specify 25 OH vitamin D2 and D3 level determination by LCMSMS test VITD23.     Iron and iron binding capacity     Status: Abnormal   Result Value Ref Range    Iron 20 (L) 37 - 145 ug/dL    Iron Binding Capacity 463 (H) 240 - 430 ug/dL    Iron Sat Index 4 (L) 15 - 46 %   CBC with platelets and differential     Status: Abnormal   Result Value Ref Range    WBC Count 8.2 4.0 - 11.0 10e3/uL    RBC Count 4.41 3.80 - 5.20 10e6/uL    Hemoglobin 10.1 (L) 11.7 - 15.7 g/dL    Hematocrit 33.8 (L) 35.0 - 47.0 %    MCV 77 (L) 78 - 100 fL    MCH 22.9 (L) 26.5 - 33.0 pg    MCHC 29.9 (L) 31.5 - 36.5 g/dL    RDW 16.7 (H) 10.0 - 15.0 %    Platelet Count 394 150 - 450 10e3/uL    % Neutrophils 46 %    % Lymphocytes 39 %    % Monocytes 7 %    % Eosinophils 7 %    % Basophils 1 %    % Immature  Granulocytes 0 %    Absolute Neutrophils 3.8 1.6 - 8.3 10e3/uL    Absolute Lymphocytes 3.2 0.8 - 5.3 10e3/uL    Absolute Monocytes 0.6 0.0 - 1.3 10e3/uL    Absolute Eosinophils 0.6 0.0 - 0.7 10e3/uL    Absolute Basophils 0.1 0.0 - 0.2 10e3/uL    Absolute Immature Granulocytes 0.0 <=0.4 10e3/uL   CBC with platelets and differential     Status: Abnormal    Narrative    The following orders were created for panel order CBC with platelets and differential.  Procedure                               Abnormality         Status                     ---------                               -----------         ------                     CBC with platelets and ...[[id number]]  Abnormal            Final result                 Please view results for these tests on the individual orders.           Signed Electronically by: Saran Rodney MD

## 2025-05-22 ENCOUNTER — PATIENT OUTREACH (OUTPATIENT)
Dept: CARE COORDINATION | Facility: CLINIC | Age: 45
End: 2025-05-22
Payer: MEDICAID

## 2025-05-22 LAB
ALBUMIN SERPL BCG-MCNC: 4.1 G/DL (ref 3.5–5.2)
ALP SERPL-CCNC: 85 U/L (ref 40–150)
ALT SERPL W P-5'-P-CCNC: 34 U/L (ref 0–50)
ANION GAP SERPL CALCULATED.3IONS-SCNC: 11 MMOL/L (ref 7–15)
AST SERPL W P-5'-P-CCNC: 30 U/L (ref 0–45)
BILIRUB SERPL-MCNC: 0.2 MG/DL
BUN SERPL-MCNC: 7.8 MG/DL (ref 6–20)
CALCIUM SERPL-MCNC: 9.2 MG/DL (ref 8.8–10.4)
CHLORIDE SERPL-SCNC: 105 MMOL/L (ref 98–107)
CREAT SERPL-MCNC: 0.47 MG/DL (ref 0.51–0.95)
CREAT UR-MCNC: 71.3 MG/DL
EGFRCR SERPLBLD CKD-EPI 2021: >90 ML/MIN/1.73M2
GLUCOSE SERPL-MCNC: 103 MG/DL (ref 70–99)
HCO3 SERPL-SCNC: 24 MMOL/L (ref 22–29)
IRON BINDING CAPACITY (ROCHE): 463 UG/DL (ref 240–430)
IRON SATN MFR SERPL: 4 % (ref 15–46)
IRON SERPL-MCNC: 20 UG/DL (ref 37–145)
MICROALBUMIN UR-MCNC: 35.6 MG/L
MICROALBUMIN/CREAT UR: 49.93 MG/G CR (ref 0–25)
POTASSIUM SERPL-SCNC: 4.3 MMOL/L (ref 3.4–5.3)
PROT SERPL-MCNC: 7.8 G/DL (ref 6.4–8.3)
SODIUM SERPL-SCNC: 140 MMOL/L (ref 135–145)
VIT D+METAB SERPL-MCNC: 27 NG/ML (ref 20–50)

## 2025-05-26 ENCOUNTER — PATIENT OUTREACH (OUTPATIENT)
Dept: CARE COORDINATION | Facility: CLINIC | Age: 45
End: 2025-05-26
Payer: MEDICAID

## 2025-06-03 ENCOUNTER — RESULTS FOLLOW-UP (OUTPATIENT)
Dept: FAMILY MEDICINE | Facility: CLINIC | Age: 45
End: 2025-06-03

## 2025-06-09 DIAGNOSIS — E11.9 TYPE 2 DIABETES MELLITUS WITHOUT COMPLICATION, WITHOUT LONG-TERM CURRENT USE OF INSULIN (H): ICD-10-CM

## 2025-08-06 ENCOUNTER — MYC REFILL (OUTPATIENT)
Dept: FAMILY MEDICINE | Facility: CLINIC | Age: 45
End: 2025-08-06

## 2025-08-06 DIAGNOSIS — J30.81 ALLERGIC RHINITIS DUE TO ANIMALS: ICD-10-CM

## 2025-08-06 PROBLEM — O99.019 ANEMIA AFFECTING PREGNANCY, ANTEPARTUM: Status: RESOLVED | Noted: 2018-09-21 | Resolved: 2025-08-06

## 2025-08-06 PROBLEM — O09.529 ANTEPARTUM MULTIGRAVIDA OF ADVANCED MATERNAL AGE: Status: RESOLVED | Noted: 2018-05-17 | Resolved: 2025-08-06

## 2025-08-06 PROBLEM — O24.414 INSULIN CONTROLLED GESTATIONAL DIABETES MELLITUS (GDM) DURING PREGNANCY, ANTEPARTUM: Status: RESOLVED | Noted: 2018-06-03 | Resolved: 2025-08-06

## 2025-08-06 PROBLEM — Z34.80 PRENATAL CARE, SUBSEQUENT PREGNANCY, UNSPECIFIED TRIMESTER: Status: RESOLVED | Noted: 2018-04-23 | Resolved: 2025-08-06

## 2025-08-07 RX ORDER — CETIRIZINE HYDROCHLORIDE 10 MG/1
10 TABLET ORAL DAILY
Qty: 90 TABLET | Refills: 2 | OUTPATIENT
Start: 2025-08-07

## 2025-08-24 ENCOUNTER — HEALTH MAINTENANCE LETTER (OUTPATIENT)
Age: 45
End: 2025-08-24